# Patient Record
Sex: FEMALE | Race: WHITE | Employment: OTHER | URBAN - METROPOLITAN AREA
[De-identification: names, ages, dates, MRNs, and addresses within clinical notes are randomized per-mention and may not be internally consistent; named-entity substitution may affect disease eponyms.]

---

## 2021-07-10 ENCOUNTER — APPOINTMENT (OUTPATIENT)
Dept: GENERAL RADIOLOGY | Age: 86
DRG: 481 | End: 2021-07-10
Attending: EMERGENCY MEDICINE
Payer: MEDICARE

## 2021-07-10 ENCOUNTER — ANESTHESIA (OUTPATIENT)
Dept: SURGERY | Age: 86
DRG: 481 | End: 2021-07-10
Payer: MEDICARE

## 2021-07-10 ENCOUNTER — APPOINTMENT (OUTPATIENT)
Dept: CT IMAGING | Age: 86
DRG: 481 | End: 2021-07-10
Attending: EMERGENCY MEDICINE
Payer: MEDICARE

## 2021-07-10 ENCOUNTER — ANESTHESIA EVENT (OUTPATIENT)
Dept: SURGERY | Age: 86
DRG: 481 | End: 2021-07-10
Payer: MEDICARE

## 2021-07-10 ENCOUNTER — APPOINTMENT (OUTPATIENT)
Dept: GENERAL RADIOLOGY | Age: 86
DRG: 481 | End: 2021-07-10
Attending: ORTHOPAEDIC SURGERY
Payer: MEDICARE

## 2021-07-10 ENCOUNTER — HOSPITAL ENCOUNTER (INPATIENT)
Age: 86
LOS: 3 days | Discharge: NURSING, REHAB OR LONG-TERM CARE | DRG: 481 | End: 2021-07-13
Attending: EMERGENCY MEDICINE | Admitting: INTERNAL MEDICINE
Payer: MEDICARE

## 2021-07-10 DIAGNOSIS — S72.002D CLOSED FRACTURE OF LEFT HIP WITH ROUTINE HEALING, SUBSEQUENT ENCOUNTER: ICD-10-CM

## 2021-07-10 DIAGNOSIS — I10 HYPERTENSION, UNSPECIFIED TYPE: ICD-10-CM

## 2021-07-10 DIAGNOSIS — S72.002A CLOSED FRACTURE OF LEFT HIP, INITIAL ENCOUNTER (HCC): Primary | ICD-10-CM

## 2021-07-10 PROBLEM — S72.009A HIP FRACTURE (HCC): Status: ACTIVE | Noted: 2021-07-10

## 2021-07-10 LAB
ALBUMIN SERPL-MCNC: 3.2 G/DL (ref 3.5–5)
ALBUMIN/GLOB SERPL: 1.1 {RATIO} (ref 1.1–2.2)
ALP SERPL-CCNC: 77 U/L (ref 45–117)
ALT SERPL-CCNC: 30 U/L (ref 12–78)
ANION GAP SERPL CALC-SCNC: 6 MMOL/L (ref 5–15)
APTT PPP: 22.5 SEC (ref 22.1–31)
AST SERPL-CCNC: 18 U/L (ref 15–37)
BASOPHILS # BLD: 0.1 K/UL (ref 0–0.1)
BASOPHILS NFR BLD: 1 % (ref 0–1)
BILIRUB SERPL-MCNC: 0.7 MG/DL (ref 0.2–1)
BUN SERPL-MCNC: 16 MG/DL (ref 6–20)
BUN/CREAT SERPL: 21 (ref 12–20)
CALCIUM SERPL-MCNC: 8.2 MG/DL (ref 8.5–10.1)
CHLORIDE SERPL-SCNC: 108 MMOL/L (ref 97–108)
CO2 SERPL-SCNC: 28 MMOL/L (ref 21–32)
COMMENT, HOLDF: NORMAL
COVID-19 RAPID TEST, COVR: NOT DETECTED
CREAT SERPL-MCNC: 0.78 MG/DL (ref 0.55–1.02)
DIFFERENTIAL METHOD BLD: ABNORMAL
EOSINOPHIL # BLD: 0.1 K/UL (ref 0–0.4)
EOSINOPHIL NFR BLD: 2 % (ref 0–7)
ERYTHROCYTE [DISTWIDTH] IN BLOOD BY AUTOMATED COUNT: 13.2 % (ref 11.5–14.5)
GLOBULIN SER CALC-MCNC: 3 G/DL (ref 2–4)
GLUCOSE SERPL-MCNC: 135 MG/DL (ref 65–100)
HCT VFR BLD AUTO: 37 % (ref 35–47)
HGB BLD-MCNC: 12.5 G/DL (ref 11.5–16)
IMM GRANULOCYTES # BLD AUTO: 0.1 K/UL (ref 0–0.04)
IMM GRANULOCYTES NFR BLD AUTO: 2 % (ref 0–0.5)
INR PPP: 1 (ref 0.9–1.1)
LYMPHOCYTES # BLD: 1.3 K/UL (ref 0.8–3.5)
LYMPHOCYTES NFR BLD: 17 % (ref 12–49)
MCH RBC QN AUTO: 31.1 PG (ref 26–34)
MCHC RBC AUTO-ENTMCNC: 33.8 G/DL (ref 30–36.5)
MCV RBC AUTO: 92 FL (ref 80–99)
MONOCYTES # BLD: 0.8 K/UL (ref 0–1)
MONOCYTES NFR BLD: 10 % (ref 5–13)
NEUTS SEG # BLD: 5.1 K/UL (ref 1.8–8)
NEUTS SEG NFR BLD: 68 % (ref 32–75)
NRBC # BLD: 0 K/UL (ref 0–0.01)
NRBC BLD-RTO: 0 PER 100 WBC
PLATELET # BLD AUTO: 215 K/UL (ref 150–400)
PMV BLD AUTO: 8.9 FL (ref 8.9–12.9)
POTASSIUM SERPL-SCNC: 3.8 MMOL/L (ref 3.5–5.1)
PROT SERPL-MCNC: 6.2 G/DL (ref 6.4–8.2)
PROTHROMBIN TIME: 10.4 SEC (ref 9–11.1)
RBC # BLD AUTO: 4.02 M/UL (ref 3.8–5.2)
SAMPLES BEING HELD,HOLD: NORMAL
SODIUM SERPL-SCNC: 142 MMOL/L (ref 136–145)
SOURCE, COVRS: NORMAL
THERAPEUTIC RANGE,PTTT: NORMAL SECS (ref 58–77)
WBC # BLD AUTO: 7.4 K/UL (ref 3.6–11)

## 2021-07-10 PROCEDURE — 73552 X-RAY EXAM OF FEMUR 2/>: CPT

## 2021-07-10 PROCEDURE — 77030008684 HC TU ET CUF COVD -B: Performed by: ANESTHESIOLOGY

## 2021-07-10 PROCEDURE — 72125 CT NECK SPINE W/O DYE: CPT

## 2021-07-10 PROCEDURE — 70450 CT HEAD/BRAIN W/O DYE: CPT

## 2021-07-10 PROCEDURE — C1713 ANCHOR/SCREW BN/BN,TIS/BN: HCPCS | Performed by: ORTHOPAEDIC SURGERY

## 2021-07-10 PROCEDURE — 77030026438 HC STYL ET INTUB CARD -A: Performed by: ANESTHESIOLOGY

## 2021-07-10 PROCEDURE — 74011250636 HC RX REV CODE- 250/636: Performed by: EMERGENCY MEDICINE

## 2021-07-10 PROCEDURE — 85025 COMPLETE CBC W/AUTO DIFF WBC: CPT

## 2021-07-10 PROCEDURE — 77030020788: Performed by: ORTHOPAEDIC SURGERY

## 2021-07-10 PROCEDURE — 77030031139 HC SUT VCRL2 J&J -A: Performed by: ORTHOPAEDIC SURGERY

## 2021-07-10 PROCEDURE — 93005 ELECTROCARDIOGRAM TRACING: CPT

## 2021-07-10 PROCEDURE — 96374 THER/PROPH/DIAG INJ IV PUSH: CPT

## 2021-07-10 PROCEDURE — 85730 THROMBOPLASTIN TIME PARTIAL: CPT

## 2021-07-10 PROCEDURE — 80053 COMPREHEN METABOLIC PANEL: CPT

## 2021-07-10 PROCEDURE — 0QS734Z REPOSITION LEFT UPPER FEMUR WITH INTERNAL FIXATION DEVICE, PERCUTANEOUS APPROACH: ICD-10-PCS | Performed by: ORTHOPAEDIC SURGERY

## 2021-07-10 PROCEDURE — 76210000016 HC OR PH I REC 1 TO 1.5 HR: Performed by: ORTHOPAEDIC SURGERY

## 2021-07-10 PROCEDURE — 72170 X-RAY EXAM OF PELVIS: CPT

## 2021-07-10 PROCEDURE — 74011250636 HC RX REV CODE- 250/636: Performed by: ORTHOPAEDIC SURGERY

## 2021-07-10 PROCEDURE — 2709999900 HC NON-CHARGEABLE SUPPLY: Performed by: ORTHOPAEDIC SURGERY

## 2021-07-10 PROCEDURE — 76010000149 HC OR TIME 1 TO 1.5 HR: Performed by: ORTHOPAEDIC SURGERY

## 2021-07-10 PROCEDURE — 74011250636 HC RX REV CODE- 250/636

## 2021-07-10 PROCEDURE — C1769 GUIDE WIRE: HCPCS | Performed by: ORTHOPAEDIC SURGERY

## 2021-07-10 PROCEDURE — 74011250636 HC RX REV CODE- 250/636: Performed by: NURSE ANESTHETIST, CERTIFIED REGISTERED

## 2021-07-10 PROCEDURE — 99285 EMERGENCY DEPT VISIT HI MDM: CPT

## 2021-07-10 PROCEDURE — 77030002933 HC SUT MCRYL J&J -A: Performed by: ORTHOPAEDIC SURGERY

## 2021-07-10 PROCEDURE — 74011250636 HC RX REV CODE- 250/636: Performed by: INTERNAL MEDICINE

## 2021-07-10 PROCEDURE — 85610 PROTHROMBIN TIME: CPT

## 2021-07-10 PROCEDURE — 73502 X-RAY EXAM HIP UNI 2-3 VIEWS: CPT

## 2021-07-10 PROCEDURE — 74011250637 HC RX REV CODE- 250/637: Performed by: ORTHOPAEDIC SURGERY

## 2021-07-10 PROCEDURE — 65270000029 HC RM PRIVATE

## 2021-07-10 PROCEDURE — 87635 SARS-COV-2 COVID-19 AMP PRB: CPT

## 2021-07-10 PROCEDURE — 77030016474 HC BIT DRL QC3 SYNT -C: Performed by: ORTHOPAEDIC SURGERY

## 2021-07-10 PROCEDURE — 71045 X-RAY EXAM CHEST 1 VIEW: CPT

## 2021-07-10 PROCEDURE — 36415 COLL VENOUS BLD VENIPUNCTURE: CPT

## 2021-07-10 PROCEDURE — 74011250636 HC RX REV CODE- 250/636: Performed by: ANESTHESIOLOGY

## 2021-07-10 PROCEDURE — 77030010507 HC ADH SKN DERMBND J&J -B: Performed by: ORTHOPAEDIC SURGERY

## 2021-07-10 PROCEDURE — 76060000033 HC ANESTHESIA 1 TO 1.5 HR: Performed by: ORTHOPAEDIC SURGERY

## 2021-07-10 DEVICE — NAIL IM L235MM DIA10MM 125DEG SHT GRN L PROX FEM TI: Type: IMPLANTABLE DEVICE | Site: FEMUR | Status: FUNCTIONAL

## 2021-07-10 DEVICE — SCREW BNE L110MM DIA10.35MM G TI CANN PERF FOR PROX FEM: Type: IMPLANTABLE DEVICE | Site: FEMUR | Status: FUNCTIONAL

## 2021-07-10 DEVICE — SCREW BNE L40MM DIA5MM TIB LT GRN TI ST CANN LOK FULL THRD: Type: IMPLANTABLE DEVICE | Site: FEMUR | Status: FUNCTIONAL

## 2021-07-10 RX ORDER — SODIUM CHLORIDE 0.9 % (FLUSH) 0.9 %
5-40 SYRINGE (ML) INJECTION AS NEEDED
Status: DISCONTINUED | OUTPATIENT
Start: 2021-07-10 | End: 2021-07-10 | Stop reason: HOSPADM

## 2021-07-10 RX ORDER — FENTANYL CITRATE 50 UG/ML
INJECTION, SOLUTION INTRAMUSCULAR; INTRAVENOUS AS NEEDED
Status: DISCONTINUED | OUTPATIENT
Start: 2021-07-10 | End: 2021-07-10 | Stop reason: HOSPADM

## 2021-07-10 RX ORDER — SODIUM CHLORIDE 0.9 % (FLUSH) 0.9 %
5-40 SYRINGE (ML) INJECTION AS NEEDED
Status: DISCONTINUED | OUTPATIENT
Start: 2021-07-10 | End: 2021-07-13 | Stop reason: HOSPADM

## 2021-07-10 RX ORDER — ENOXAPARIN SODIUM 100 MG/ML
40 INJECTION SUBCUTANEOUS DAILY
Status: DISCONTINUED | OUTPATIENT
Start: 2021-07-11 | End: 2021-07-10 | Stop reason: SDUPTHER

## 2021-07-10 RX ORDER — ENOXAPARIN SODIUM 100 MG/ML
30 INJECTION SUBCUTANEOUS EVERY 12 HOURS
Status: DISCONTINUED | OUTPATIENT
Start: 2021-07-10 | End: 2021-07-13 | Stop reason: HOSPADM

## 2021-07-10 RX ORDER — ACETAMINOPHEN 325 MG/1
650 TABLET ORAL EVERY 6 HOURS
Status: DISCONTINUED | OUTPATIENT
Start: 2021-07-11 | End: 2021-07-13 | Stop reason: HOSPADM

## 2021-07-10 RX ORDER — ESCITALOPRAM OXALATE 10 MG/1
10 TABLET ORAL DAILY
Status: DISCONTINUED | OUTPATIENT
Start: 2021-07-11 | End: 2021-07-13 | Stop reason: HOSPADM

## 2021-07-10 RX ORDER — PREDNISONE 5 MG/1
10 TABLET ORAL
Status: DISCONTINUED | OUTPATIENT
Start: 2021-07-11 | End: 2021-07-11

## 2021-07-10 RX ORDER — FLUMAZENIL 0.1 MG/ML
0.2 INJECTION INTRAVENOUS
Status: DISCONTINUED | OUTPATIENT
Start: 2021-07-10 | End: 2021-07-10 | Stop reason: HOSPADM

## 2021-07-10 RX ORDER — AMOXICILLIN 250 MG
1 CAPSULE ORAL 2 TIMES DAILY
Status: DISCONTINUED | OUTPATIENT
Start: 2021-07-10 | End: 2021-07-13 | Stop reason: HOSPADM

## 2021-07-10 RX ORDER — ACETAMINOPHEN 325 MG/1
650 TABLET ORAL
Status: DISCONTINUED | OUTPATIENT
Start: 2021-07-10 | End: 2021-07-13 | Stop reason: HOSPADM

## 2021-07-10 RX ORDER — SUCCINYLCHOLINE CHLORIDE 20 MG/ML
INJECTION INTRAMUSCULAR; INTRAVENOUS AS NEEDED
Status: DISCONTINUED | OUTPATIENT
Start: 2021-07-10 | End: 2021-07-10 | Stop reason: HOSPADM

## 2021-07-10 RX ORDER — ONDANSETRON 2 MG/ML
4 INJECTION INTRAMUSCULAR; INTRAVENOUS AS NEEDED
Status: DISCONTINUED | OUTPATIENT
Start: 2021-07-10 | End: 2021-07-10 | Stop reason: HOSPADM

## 2021-07-10 RX ORDER — ONDANSETRON 2 MG/ML
4 INJECTION INTRAMUSCULAR; INTRAVENOUS
Status: DISCONTINUED | OUTPATIENT
Start: 2021-07-10 | End: 2021-07-13 | Stop reason: HOSPADM

## 2021-07-10 RX ORDER — ALBUTEROL SULFATE 0.83 MG/ML
2.5 SOLUTION RESPIRATORY (INHALATION) AS NEEDED
Status: DISCONTINUED | OUTPATIENT
Start: 2021-07-10 | End: 2021-07-10 | Stop reason: HOSPADM

## 2021-07-10 RX ORDER — SODIUM CHLORIDE, SODIUM LACTATE, POTASSIUM CHLORIDE, CALCIUM CHLORIDE 600; 310; 30; 20 MG/100ML; MG/100ML; MG/100ML; MG/100ML
INJECTION, SOLUTION INTRAVENOUS
Status: DISCONTINUED | OUTPATIENT
Start: 2021-07-10 | End: 2021-07-10 | Stop reason: HOSPADM

## 2021-07-10 RX ORDER — CEFAZOLIN SODIUM 1 G/3ML
INJECTION, POWDER, FOR SOLUTION INTRAMUSCULAR; INTRAVENOUS AS NEEDED
Status: DISCONTINUED | OUTPATIENT
Start: 2021-07-10 | End: 2021-07-10 | Stop reason: HOSPADM

## 2021-07-10 RX ORDER — ESCITALOPRAM OXALATE 20 MG/1
20 TABLET ORAL DAILY
COMMUNITY

## 2021-07-10 RX ORDER — FENTANYL CITRATE 50 UG/ML
25 INJECTION, SOLUTION INTRAMUSCULAR; INTRAVENOUS
Status: DISCONTINUED | OUTPATIENT
Start: 2021-07-10 | End: 2021-07-10 | Stop reason: HOSPADM

## 2021-07-10 RX ORDER — HYDROMORPHONE HYDROCHLORIDE 1 MG/ML
0.2 INJECTION, SOLUTION INTRAMUSCULAR; INTRAVENOUS; SUBCUTANEOUS
Status: DISCONTINUED | OUTPATIENT
Start: 2021-07-10 | End: 2021-07-13 | Stop reason: HOSPADM

## 2021-07-10 RX ORDER — SODIUM CHLORIDE, SODIUM LACTATE, POTASSIUM CHLORIDE, CALCIUM CHLORIDE 600; 310; 30; 20 MG/100ML; MG/100ML; MG/100ML; MG/100ML
125 INJECTION, SOLUTION INTRAVENOUS CONTINUOUS
Status: DISPENSED | OUTPATIENT
Start: 2021-07-10 | End: 2021-07-11

## 2021-07-10 RX ORDER — DIPHENHYDRAMINE HYDROCHLORIDE 50 MG/ML
12.5 INJECTION, SOLUTION INTRAMUSCULAR; INTRAVENOUS AS NEEDED
Status: DISCONTINUED | OUTPATIENT
Start: 2021-07-10 | End: 2021-07-10 | Stop reason: HOSPADM

## 2021-07-10 RX ORDER — FENTANYL CITRATE 50 UG/ML
25 INJECTION, SOLUTION INTRAMUSCULAR; INTRAVENOUS
Status: COMPLETED | OUTPATIENT
Start: 2021-07-10 | End: 2021-07-10

## 2021-07-10 RX ORDER — NALOXONE HYDROCHLORIDE 0.4 MG/ML
0.04 INJECTION, SOLUTION INTRAMUSCULAR; INTRAVENOUS; SUBCUTANEOUS
Status: DISCONTINUED | OUTPATIENT
Start: 2021-07-10 | End: 2021-07-10 | Stop reason: HOSPADM

## 2021-07-10 RX ORDER — FERROUS SULFATE, DRIED 160(50) MG
1 TABLET, EXTENDED RELEASE ORAL
Status: DISCONTINUED | OUTPATIENT
Start: 2021-07-11 | End: 2021-07-13 | Stop reason: HOSPADM

## 2021-07-10 RX ORDER — PREDNISONE 10 MG/1
10 TABLET ORAL DAILY
COMMUNITY
End: 2021-07-13

## 2021-07-10 RX ORDER — SODIUM CHLORIDE 0.9 % (FLUSH) 0.9 %
5-40 SYRINGE (ML) INJECTION EVERY 8 HOURS
Status: DISCONTINUED | OUTPATIENT
Start: 2021-07-10 | End: 2021-07-13 | Stop reason: HOSPADM

## 2021-07-10 RX ORDER — HYDROMORPHONE HYDROCHLORIDE 1 MG/ML
.25-1 INJECTION, SOLUTION INTRAMUSCULAR; INTRAVENOUS; SUBCUTANEOUS
Status: DISCONTINUED | OUTPATIENT
Start: 2021-07-10 | End: 2021-07-10 | Stop reason: HOSPADM

## 2021-07-10 RX ORDER — ACETAMINOPHEN 650 MG/1
650 SUPPOSITORY RECTAL
Status: DISCONTINUED | OUTPATIENT
Start: 2021-07-10 | End: 2021-07-13 | Stop reason: HOSPADM

## 2021-07-10 RX ORDER — SODIUM CHLORIDE 0.9 % (FLUSH) 0.9 %
5-40 SYRINGE (ML) INJECTION EVERY 8 HOURS
Status: DISCONTINUED | OUTPATIENT
Start: 2021-07-10 | End: 2021-07-10 | Stop reason: HOSPADM

## 2021-07-10 RX ORDER — PROPOFOL 10 MG/ML
INJECTION, EMULSION INTRAVENOUS AS NEEDED
Status: DISCONTINUED | OUTPATIENT
Start: 2021-07-10 | End: 2021-07-10 | Stop reason: HOSPADM

## 2021-07-10 RX ORDER — DEXAMETHASONE SODIUM PHOSPHATE 4 MG/ML
INJECTION, SOLUTION INTRA-ARTICULAR; INTRALESIONAL; INTRAMUSCULAR; INTRAVENOUS; SOFT TISSUE AS NEEDED
Status: DISCONTINUED | OUTPATIENT
Start: 2021-07-10 | End: 2021-07-10 | Stop reason: HOSPADM

## 2021-07-10 RX ORDER — FACIAL-BODY WIPES
10 EACH TOPICAL DAILY PRN
Status: DISCONTINUED | OUTPATIENT
Start: 2021-07-12 | End: 2021-07-13 | Stop reason: HOSPADM

## 2021-07-10 RX ORDER — FENTANYL CITRATE 50 UG/ML
INJECTION, SOLUTION INTRAMUSCULAR; INTRAVENOUS
Status: COMPLETED
Start: 2021-07-10 | End: 2021-07-10

## 2021-07-10 RX ORDER — POLYETHYLENE GLYCOL 3350 17 G/17G
17 POWDER, FOR SOLUTION ORAL DAILY PRN
Status: DISCONTINUED | OUTPATIENT
Start: 2021-07-10 | End: 2021-07-13 | Stop reason: HOSPADM

## 2021-07-10 RX ORDER — LIDOCAINE HYDROCHLORIDE 10 MG/ML
0.1 INJECTION, SOLUTION EPIDURAL; INFILTRATION; INTRACAUDAL; PERINEURAL AS NEEDED
Status: DISCONTINUED | OUTPATIENT
Start: 2021-07-10 | End: 2021-07-10 | Stop reason: HOSPADM

## 2021-07-10 RX ORDER — VERAPAMIL HYDROCHLORIDE 40 MG/1
120 TABLET ORAL
COMMUNITY

## 2021-07-10 RX ORDER — SODIUM CHLORIDE, SODIUM LACTATE, POTASSIUM CHLORIDE, CALCIUM CHLORIDE 600; 310; 30; 20 MG/100ML; MG/100ML; MG/100ML; MG/100ML
125 INJECTION, SOLUTION INTRAVENOUS CONTINUOUS
Status: DISCONTINUED | OUTPATIENT
Start: 2021-07-10 | End: 2021-07-10 | Stop reason: HOSPADM

## 2021-07-10 RX ORDER — SODIUM CHLORIDE 9 MG/ML
125 INJECTION, SOLUTION INTRAVENOUS CONTINUOUS
Status: DISPENSED | OUTPATIENT
Start: 2021-07-10 | End: 2021-07-11

## 2021-07-10 RX ORDER — ONDANSETRON 4 MG/1
4 TABLET, ORALLY DISINTEGRATING ORAL
Status: DISCONTINUED | OUTPATIENT
Start: 2021-07-10 | End: 2021-07-13 | Stop reason: HOSPADM

## 2021-07-10 RX ORDER — NALOXONE HYDROCHLORIDE 0.4 MG/ML
0.4 INJECTION, SOLUTION INTRAMUSCULAR; INTRAVENOUS; SUBCUTANEOUS AS NEEDED
Status: DISCONTINUED | OUTPATIENT
Start: 2021-07-10 | End: 2021-07-13 | Stop reason: HOSPADM

## 2021-07-10 RX ORDER — VERAPAMIL HYDROCHLORIDE 120 MG/1
120 TABLET, FILM COATED, EXTENDED RELEASE ORAL
Status: DISCONTINUED | OUTPATIENT
Start: 2021-07-11 | End: 2021-07-13 | Stop reason: HOSPADM

## 2021-07-10 RX ORDER — POLYETHYLENE GLYCOL 3350 17 G/17G
17 POWDER, FOR SOLUTION ORAL DAILY
Status: DISCONTINUED | OUTPATIENT
Start: 2021-07-11 | End: 2021-07-13 | Stop reason: HOSPADM

## 2021-07-10 RX ADMIN — CEFAZOLIN SODIUM 2 G: 1 POWDER, FOR SOLUTION INTRAMUSCULAR; INTRAVENOUS at 18:53

## 2021-07-10 RX ADMIN — Medication 10 ML: at 22:34

## 2021-07-10 RX ADMIN — FENTANYL CITRATE 25 MCG: 50 INJECTION, SOLUTION INTRAMUSCULAR; INTRAVENOUS at 15:25

## 2021-07-10 RX ADMIN — HYDROMORPHONE HYDROCHLORIDE 0.2 MG: 1 INJECTION, SOLUTION INTRAMUSCULAR; INTRAVENOUS; SUBCUTANEOUS at 16:18

## 2021-07-10 RX ADMIN — HYDROMORPHONE HYDROCHLORIDE 0.5 MG: 1 INJECTION, SOLUTION INTRAMUSCULAR; INTRAVENOUS; SUBCUTANEOUS at 20:02

## 2021-07-10 RX ADMIN — SODIUM CHLORIDE, POTASSIUM CHLORIDE, SODIUM LACTATE AND CALCIUM CHLORIDE: 600; 310; 30; 20 INJECTION, SOLUTION INTRAVENOUS at 18:30

## 2021-07-10 RX ADMIN — HYDROMORPHONE HYDROCHLORIDE 0.5 MG: 1 INJECTION, SOLUTION INTRAMUSCULAR; INTRAVENOUS; SUBCUTANEOUS at 20:14

## 2021-07-10 RX ADMIN — SODIUM CHLORIDE 125 ML/HR: 9 INJECTION, SOLUTION INTRAVENOUS at 20:11

## 2021-07-10 RX ADMIN — DEXAMETHASONE SODIUM PHOSPHATE 8 MG: 4 INJECTION, SOLUTION INTRAMUSCULAR; INTRAVENOUS at 19:00

## 2021-07-10 RX ADMIN — FENTANYL CITRATE 50 MCG: 50 INJECTION, SOLUTION INTRAMUSCULAR; INTRAVENOUS at 18:53

## 2021-07-10 RX ADMIN — PROPOFOL 100 MG: 10 INJECTION, EMULSION INTRAVENOUS at 18:41

## 2021-07-10 RX ADMIN — HYDROMORPHONE HYDROCHLORIDE 0.5 MG: 1 INJECTION, SOLUTION INTRAMUSCULAR; INTRAVENOUS; SUBCUTANEOUS at 20:30

## 2021-07-10 RX ADMIN — FENTANYL CITRATE 25 MCG: 50 INJECTION, SOLUTION INTRAMUSCULAR; INTRAVENOUS at 18:20

## 2021-07-10 RX ADMIN — ONDANSETRON 4 MG: 2 INJECTION INTRAMUSCULAR; INTRAVENOUS at 16:17

## 2021-07-10 RX ADMIN — FENTANYL CITRATE 25 MCG: 50 INJECTION, SOLUTION INTRAMUSCULAR; INTRAVENOUS at 18:30

## 2021-07-10 RX ADMIN — Medication 5 ML: at 16:20

## 2021-07-10 RX ADMIN — FENTANYL CITRATE 50 MCG: 50 INJECTION, SOLUTION INTRAMUSCULAR; INTRAVENOUS at 18:41

## 2021-07-10 RX ADMIN — SUCCINYLCHOLINE CHLORIDE 100 MG: 20 INJECTION, SOLUTION INTRAMUSCULAR; INTRAVENOUS at 18:41

## 2021-07-10 RX ADMIN — DOCUSATE SODIUM 50MG AND SENNOSIDES 8.6MG 1 TABLET: 8.6; 5 TABLET, FILM COATED ORAL at 22:33

## 2021-07-10 NOTE — ED TRIAGE NOTES
Patient arrives via EMS from daughter's house following GLF and left hip pain. Patient presents with external rotation of left leg, with shortening. Per EMS, patient uses walker. States patient went to get clothing, turning away from walker when she fell. Denies hitting head, no LOC, no use of blood thinning medications. Patient is A&O x 4. In route patient received:   4 mg of zofran  100 mcg of fentanyl. B  BP: 146/62  HR: 62  RR: 16  SpO2: 97% RA. Hx of vertigo, breast cancer, left mastectomy.

## 2021-07-10 NOTE — INTERVAL H&P NOTE
Update History & Physical    The Patient's History and Physical of December 10,   H&P was reviewed with the patient and I examined the patient. There was no change. The surgical site was confirmed by the patient and me. Plan:  The risk, benefits, expected outcome, and alternative to the recommended procedure have been discussed with the patient. Patient understands and wants to proceed with the procedure.     Electronically signed by Florencio Hernandez MD on 7/10/2021 at 6:33 PM

## 2021-07-10 NOTE — H&P
Hospitalist Admission Note    NAME: Naila Mcdonnell   :  1929   MRN:  [id number]     Date/Time:  7/10/2021 4:09 PM    Patient PCP: Leonid Hatch, Not On File, [place]  ________________________________________________________________________    Given the patient's current clinical presentation, I have a high level of concern for decompensation if discharged from the emergency department. Complex decision making was performed, which includes reviewing the patient's available past medical records, laboratory results, and x-ray films. My assessment of this patient's clinical condition and my plan of care is as follows. Assessment / Plan:    #L hip fx: slightly rotated, superiorly displaced, intertrochanteric fracture of the proximal left femur sustained after mechanical fall. She has essentially no cardiac risk factors. Discussed that greatest risks for her are due to chronic steroid use as discussed below. From a medical standpoint she does not require further cardiopulm eval prior to surgery. - To OR this evening   - [place], moving to [place] soon but will likely need SNF prior to that     #Chronic Steroid Use: Presently on 10mg, so at risk for HPA suppression. In the short term will need to monitor for adrenal insufficiency. In the long term, she is at higher risk of orthopedic infx due to steroid use, so would recommend tapering off. - For now, cont pred 10mg and monitor for stress dosing need   - Post-operatively, would taper off over 2 weeks    #HTN: On verapamil as outpt. Higher here in s/o pain   - Cont verap      I have personally reviewed the radiographs, laboratory data in Epic and decisions and statements above are based partially on this personal interpretation. Code Status: Full Code  DVT Prophylaxis: Lovenox  GI Prophylaxis: not indicated       Subjective:   CHIEF COMPLAINT: \"fall\"    HISTORY OF PRESENT ILLNESS:     Glenn Underwood is a 80 y.o.  F with PMHx HTN and arthritis presents after mechanical fall resulting in L intertrochanteric fracture with displacement. She lives in [place] and was visiting family (will be moving in). She was in the closet, turned, and went down. She has had some balance issues recently. She did not her her head or lose consciousness. She did not have cp, sob before this.      PMHx:  HTN  Depression  Arthritis  [facility] [place]    Surg Hx:   Appy  Mastectomy  Choly    Social History     Tobacco Use    Smoking status: Not on file   Substance Use Topics    Alcohol use: Not on file     Fhx:   No hx CAD, issues with anesthesia    Allergies   Allergen Reactions    Latex Hives    Penicillins Unknown (comments)        Prior to Admission medications    Not on File     REVIEW OF SYSTEMS:  See HPI for details  General: negative for fever, chills, sweats, weakness, weight loss  Eyes: negative for blurred vision, eye pain, loss of vision, diplopia  Ear Nose and Throat: negative for rhinorrhea, pharyngitis, otalgia, tinnitus, speech or swallowing difficulties  Respiratory:  negative for pleuritic pain, cough, sputum production, wheezing, SOB, FONTAINE  Cardiology:  negative for chest pain, palpitations, orthopnea, PND, edema, syncope   Gastrointestinal: negative for abdominal pain, N/V, dysphagia, change in bowel habits, bleeding  Genitourinary: negative for frequency, urgency, dysuria, hematuria, incontinence  Muskuloskeletal : negative for arthralgia, myalgia  Hematology: negative for easy bruising, bleeding, lymphadenopathy  Dermatological: negative for rash, ulceration, mole change, new lesion  Endocrine: negative for hot flashes or polydipsia  Neurological: negative for headache, dizziness, confusion, focal weakness, paresthesia, memory loss, gait disturbance  Psychological: negative for anxiety, depression, agitation    Objective:   VITALS:    Visit Vitals  BP (!) 179/58 (BP 1 Location: Right upper arm, BP Patient Position: At rest)   Pulse 70   Temp 98.2 °F (36.8 °C)   Resp 18   Ht 5' 2\" (1.575 m)   Wt 67.1 kg (148 lb)   SpO2 94%   BMI 27.07 kg/m²     PHYSICAL EXAM:    Physical Exam:    Gen: Well-developed, well-nourished, in no acute distress  HEENT:  Pink conjunctivae, PERRL, hearing intact to voice, moist mucous membranes  Neck: Supple, without masses, thyroid non-tender  Resp: No accessory muscle use, clear breath sounds without wheezes rales or rhonchi  Card: No murmurs, normal S1, S2 without thrills, bruits or peripheral edema  Abd:  Soft, non-tender, non-distended, normoactive bowel sounds are present, no palpable organomegaly and no detectable hernias  Lymph:  No cervical or inguinal adenopathy  Musc: No cyanosis or clubbing  Skin: No rashes or ulcers, skin turgor is good  Neuro:  Cranial nerves are grossly intact, no focal motor weakness, follows commands appropriately  Psych:  Good insight, oriented to person, place and time, alert          _______________________________________________________________________  Care Plan discussed with:    Comments   Patient x Discussed with patient in room. POC outlined and Questions answered    Family  x    RN x    Care Manager x                   Consultant:  raleigh JAY MD   _______________________________________________________________________  Recommended Disposition:   Home with Family x   HH/PT/OT/RN    SNF/LTC    [facility]    ________________________________________________________________________  TOTAL TIME:  60 Minutes        Comments   >50% of visit spent in counseling and coordination of care  Chart reviewed  Discussion with patient and/or family and questions answered     ________________________________________________________________________  Signed: Radha Brannon MD    This note will not be viewable in [address] Procedures: see electronic medical records for all procedures/Xrays and details which were not copied into this note but were reviewed prior to creation of Plan.     LAB DATA REVIEWED:    Recent Results (from the past 24 hour(s)) CBC WITH AUTOMATED DIFF    Collection Time: 07/10/21  2:07 PM   Result Value Ref Range    WBC 7.4 3.6 - 11.0 K/uL    RBC 4.02 3.80 - 5.20 M/uL    HGB 12.5 11.5 - 16.0 g/dL    HCT 37.0 35.0 - 47.0 %    MCV 92.0 80.0 - 99.0 FL    MCH 31.1 26.0 - 34.0 PG    MCHC 33.8 30.0 - 36.5 g/dL    RDW 13.2 11.5 - 14.5 %    PLATELET 810 452 - 781 K/uL    MPV 8.9 8.9 - 12.9 FL    NRBC 0.0 0  WBC    ABSOLUTE NRBC 0.00 0.00 - 0.01 K/uL    NEUTROPHILS 68 32 - 75 %    LYMPHOCYTES 17 12 - 49 %    MONOCYTES 10 5 - 13 %    EOSINOPHILS 2 0 - 7 %    BASOPHILS 1 0 - 1 %    IMMATURE GRANULOCYTES 2 (H) 0.0 - 0.5 %    ABS. NEUTROPHILS 5.1 1.8 - 8.0 K/UL    ABS. LYMPHOCYTES 1.3 0.8 - 3.5 K/UL    ABS. MONOCYTES 0.8 0.0 - 1.0 K/UL    ABS. EOSINOPHILS 0.1 0.0 - 0.4 K/UL    ABS. BASOPHILS 0.1 0.0 - 0.1 K/UL    ABS. IMM. GRANS. 0.1 (H) 0.00 - 0.04 K/UL    DF AUTOMATED     METABOLIC PANEL, COMPREHENSIVE    Collection Time: 07/10/21  2:07 PM   Result Value Ref Range    Sodium 142 136 - 145 mmol/L    Potassium 3.8 3.5 - 5.1 mmol/L    Chloride 108 97 - 108 mmol/L    CO2 28 21 - 32 mmol/L    Anion gap 6 5 - 15 mmol/L    Glucose 135 (H) 65 - 100 mg/dL    BUN 16 6 - 20 MG/DL    Creatinine 0.78 0.55 - 1.02 MG/DL    BUN/Creatinine ratio 21 (H) 12 - 20      GFR est AA >60 >60 ml/min/1.73m2    GFR est non-AA >60 >60 ml/min/1.73m2    Calcium 8.2 (L) 8.5 - 10.1 MG/DL    Bilirubin, total 0.7 0.2 - 1.0 MG/DL    ALT (SGPT) 30 12 - 78 U/L    AST (SGOT) 18 15 - 37 U/L    Alk.  phosphatase 77 45 - 117 U/L    Protein, total 6.2 (L) 6.4 - 8.2 g/dL    Albumin 3.2 (L) 3.5 - 5.0 g/dL    Globulin 3.0 2.0 - 4.0 g/dL    A-G Ratio 1.1 1.1 - 2.2     PROTHROMBIN TIME + INR    Collection Time: 07/10/21  2:07 PM   Result Value Ref Range    INR 1.0 0.9 - 1.1      Prothrombin time 10.4 9.0 - 11.1 sec   PTT    Collection Time: 07/10/21  2:07 PM   Result Value Ref Range    aPTT 22.5 22.1 - 31.0 sec    aPTT, therapeutic range     58.0 - 77.0 SECS   SAMPLES BEING HELD Collection Time: 07/10/21  2:07 PM   Result Value Ref Range    SAMPLES BEING HELD 1 SST, 1 RED     COMMENT        Add-on orders for these samples will be processed based on acceptable specimen integrity and analyte stability, which may vary by analyte.

## 2021-07-10 NOTE — OP NOTES
OPERATIVE REPORT   Admit Date: 7/10/2021  Admit Diagnosis: Hip fracture ([address] [S72.009A]    Date of Procedure: 7/10/2021   Preoperative Diagnosis: intertrochanteric fracture left hip  Postoperative Diagnosis: * No post-op diagnosis entered *    Procedure: Procedure(s): FEMORAL INTERTROCHANTERIC NAIL INSERTION LEFT  Surgeon: Lizette Peters MD  Assistant(s): None  Anesthesia: General   Estimated Blood Loss: 20cc  Specimens: * No specimens in log *   Complications: None        INDICATIONS:     The patient is a 80 y.o.,  with an acute fall and was found to have an intertrochanteric hip fracture. The patient was evaluated by the hospitalist and cleared for surgery. Informed consent obtained including a discussion of the risks and benefits, which include, but are not limited to, bleeding, infection, neurovascular damage, wound complications, pain and stiffness in the knee, periprosthetic loosening, fracture dislocation and DVT, the patient consented for the procedure. DESCRIPTION OF PROCEDURE:             The patient underwent the appropriate anesthesia and was taken to the operating room. She was positioned on the fracture table and both extremities were well padded with a well padded post for traction. Under C-arm guidance the fracture was reduced and verified in both AP and lateral planes. The hip was then prepped and drapped in a sterile fashion. Prior to the incision the appropriate time-out was performed. Utilizing fluoroscopic guidance the start point was established on AP and lateral views with the fracture reduced. After the entry was created a bulb tipped guide-wire was inserted and placement verified on AP and lateral at the hip and knee. The nail length was measured off the guide-wire and one-step proximal and distal reaming was performed. The nail was the inserted to the appropriate depth with fluoroscopy guidance.  The position was verified on AP and lateral views and then a separate incision was made laterally for lag screw insertion. The cannula inserted and the guide-wire was placed under fluoro to the sub-chondral bone of the femoral head. This was measured and then the appropriate depth drill was used to create an entry hole. The lag screw was inserted and the fracture compressed as needed. Final films were obtained of the hip and knee. The wounds were copiously irrigated. Closure was performed in layer with 2-Vicryl for the subcutaneous tissue, 2-0 Vicryl for the skin and stapples. A sterile dressing was then applied. The patient was taken to recovery in a stable condition.      IMPLANTS :   Sythes TFN Nail, intermediate

## 2021-07-10 NOTE — CONSULTS
ORTHO CONSULT NOTE    Date of Consultation:  July 10, 2021  Referring Physician:  Naila Smith  CC: left hip pain    HPI:  Corey Smith is a 80 y.o. female PMH lumbar spine surgery and breast ca who c/o left hip pain s/p GLF at daughter's home. She states she simply lost her balance and fell onto left side. She denies CP, lightheaded, or dizziness prior to her fall. She denies open hip wound and foot numbness. She denies blood thinners at home. Last PO intake 10:00 breakfast.    At baseline, she ambulates with a walker and resides in [address] [place] [address]. She already planned to move in with her daughter in [facility] later this year. Social History     Tobacco Use    Smoking status: Not on file   Substance Use Topics    Alcohol use: Not on file     Allergies   Allergen Reactions    Latex Hives    Penicillins Unknown (comments)        Review of Systems:  Per HPI. Objective:     Patient Vitals for the past 8 hrs:   BP Temp Pulse Resp SpO2 Height Weight   07/10/21 1352 (!) 179/58 98.2 °F (36.8 °C) 70 18 94 % 5' 2\" (1.575 m) 67.1 kg (148 lb)     Temp (24hrs), Av.2 °F (36.8 °C), Min:98.2 °F (36.8 °C), Max:98.2 °F (36.8 °C)      EXAM:   NAD. Answers questions appropriately. Oriented. Daughter present bedside. Moves BUE spontaneously with NTTP long bones and joints. RLE no pain PROM, NTTP long bones and joints. Moves foot OK with SILT and CR toes < 2 secs. LLE shortened. Hip skin intact and soft compartments. Knee, ankle and foot NTTP. Moves foot OK with SILT and CR toes < 2 secs. Bilat calf soft and NTTP. Imaging Review:   Left femur xray 7/10:  Intertrochanteric fracture.     Labs:   Recent Results (from the past 24 hour(s))   CBC WITH AUTOMATED DIFF    Collection Time: 07/10/21  2:07 PM   Result Value Ref Range    WBC 7.4 3.6 - 11.0 K/uL    RBC 4.02 3.80 - 5.20 M/uL    HGB 12.5 11.5 - 16.0 g/dL    HCT 37.0 35.0 - 47.0 %    MCV 92.0 80.0 - 99.0 FL    MCH 31.1 26.0 - 34.0 PG    MCHC 33.8 30.0 - 36.5 g/dL    RDW 13.2 11.5 - 14.5 %    PLATELET 298 224 - 323 K/uL    MPV 8.9 8.9 - 12.9 FL    NRBC 0.0 0  WBC    ABSOLUTE NRBC 0.00 0.00 - 0.01 K/uL    NEUTROPHILS 68 32 - 75 %    LYMPHOCYTES 17 12 - 49 %    MONOCYTES 10 5 - 13 %    EOSINOPHILS 2 0 - 7 %    BASOPHILS 1 0 - 1 %    IMMATURE GRANULOCYTES 2 (H) 0.0 - 0.5 %    ABS. NEUTROPHILS 5.1 1.8 - 8.0 K/UL    ABS. LYMPHOCYTES 1.3 0.8 - 3.5 K/UL    ABS. MONOCYTES 0.8 0.0 - 1.0 K/UL    ABS. EOSINOPHILS 0.1 0.0 - 0.4 K/UL    ABS. BASOPHILS 0.1 0.0 - 0.1 K/UL    ABS. IMM. GRANS. 0.1 (H) 0.00 - 0.04 K/UL    DF AUTOMATED     METABOLIC PANEL, COMPREHENSIVE    Collection Time: 07/10/21  2:07 PM   Result Value Ref Range    Sodium 142 136 - 145 mmol/L    Potassium 3.8 3.5 - 5.1 mmol/L    Chloride 108 97 - 108 mmol/L    CO2 28 21 - 32 mmol/L    Anion gap 6 5 - 15 mmol/L    Glucose 135 (H) 65 - 100 mg/dL    BUN 16 6 - 20 MG/DL    Creatinine 0.78 0.55 - 1.02 MG/DL    BUN/Creatinine ratio 21 (H) 12 - 20      GFR est AA >60 >60 ml/min/1.73m2    GFR est non-AA >60 >60 ml/min/1.73m2    Calcium 8.2 (L) 8.5 - 10.1 MG/DL    Bilirubin, total 0.7 0.2 - 1.0 MG/DL    ALT (SGPT) 30 12 - 78 U/L    AST (SGOT) 18 15 - 37 U/L    Alk. phosphatase 77 45 - 117 U/L    Protein, total 6.2 (L) 6.4 - 8.2 g/dL    Albumin 3.2 (L) 3.5 - 5.0 g/dL    Globulin 3.0 2.0 - 4.0 g/dL    A-G Ratio 1.1 1.1 - 2.2     PROTHROMBIN TIME + INR    Collection Time: 07/10/21  2:07 PM   Result Value Ref Range    INR 1.0 0.9 - 1.1      Prothrombin time 10.4 9.0 - 11.1 sec   PTT    Collection Time: 07/10/21  2:07 PM   Result Value Ref Range    aPTT 22.5 22.1 - 31.0 sec    aPTT, therapeutic range     58.0 - 77.0 SECS   SAMPLES BEING HELD    Collection Time: 07/10/21  2:07 PM   Result Value Ref Range    SAMPLES BEING HELD 1 SST, 1 RED     COMMENT        Add-on orders for these samples will be processed based on acceptable specimen integrity and analyte stability, which may vary by analyte. Impression:     Patient Active Problem List    Diagnosis Date Noted    Hip fracture ([facility] [address] 07/10/2021     Active Problems:    Hip fracture ([facility] [address] (7/10/2021)        Plan:   I explained the nature of the injury and discussed the recommended surgery. I discussed potential risks/benefits/alternatives of surgery and patient consents. Daughter present for discussion and also in agreement. Plan for IM nail left hip fracture. Medical evaluation completed and no further work-up needed per Dr. Effie Lesches. Bedrest.  NPO. Ice. SCDs OK. Hold pre-op anticoagulants. I have discussed with the patient the rationale for potential blood component transfusion; its benefits in treating or preventing fatigue, organ damage, or death; and its risk which includes mild transfusion reactions, rare risk of blood borne infection, or more serious but rare reactions. I have discussed the alternatives to transfusion, including the risk and consequences of not receiving transfusion. The patient had an opportunity to ask questions and had agreed to proceed with transfusion of blood components if deemed appropriate in shawn-operative period. Dr. Soni Nino is aware and agrees with above plan.       SOLITARIO Brian  Orthopedic Trauma Service  [facility]

## 2021-07-10 NOTE — ANESTHESIA POSTPROCEDURE EVALUATION
Procedure(s): FEMORAL INTERTROCHANTERIC NAIL INSERTION LEFT. general    Anesthesia Post Evaluation      Multimodal analgesia: multimodal analgesia not used between 6 hours prior to anesthesia start to PACU discharge  Patient location during evaluation: PACU  Patient participation: complete - patient participated  Level of consciousness: awake  Pain management: adequate  Airway patency: patent  Anesthetic complications: no  Cardiovascular status: acceptable, blood pressure returned to baseline and hemodynamically stable  Respiratory status: acceptable  Hydration status: acceptable  Post anesthesia nausea and vomiting:  controlled  Final Post Anesthesia Temperature Assessment:  Normothermia (36.0-37.5 degrees C)      INITIAL Post-op Vital signs:   Vitals Value Taken Time   /64 07/10/21 1945   Temp 37.2 °C (99 °F) 07/10/21 1945   Pulse 77 07/10/21 1950   Resp 18 07/10/21 1950   SpO2 100 % 07/10/21 1950   Vitals shown include unvalidated device data.

## 2021-07-10 NOTE — CONSULTS
Orthopaedic PRE-OP Admission History and Physical        Subjective:   Patient is a 80 y.o.  female who presented for  Left prox femur fracture. The patient was evaluated and determined the most appropriate plan of care is to proceed with surgical intervention. Conservative measures were not indicated or successful. Tamiko Bryan is a 80 y.o. female PMH lumbar spine surgery and breast ca who c/o left hip pain s/p GLF at daughter's home. She states she simply lost her balance and fell onto left side. She denies CP, lightheaded, or dizziness prior to her fall. She denies open hip wound and foot numbness. She denies blood thinners at home. Last PO intake 10:00 breakfast.     At baseline, she ambulates with a walker and resides in [address] [place] [address] in [place]. She already planned to move in with her daughter in [facility] later this year. Patient Active Problem List    Diagnosis Date Noted    Hip fracture ([facility] [address] 07/10/2021     Past Medical History:   Diagnosis Date    Arthritis     Cancer ([facility] [address]     Chronic pain     Hypertension     Ill-defined condition     Macular degeneration    Ill-defined condition     Vertigo      Past Surgical History:   Procedure Laterality Date    HX ORTHOPAEDIC Right     achilles tendon    HX ORTHOPAEDIC      Lumbar fusion    CO BREAST SURGERY PROCEDURE UNLISTED  06/30/2012    Lumpectomy x2    CO BREAST SURGERY PROCEDURE UNLISTED  06/30/2012    Mastectomy bilateral      Prior to Admission medications    Medication Sig Start Date End Date Taking? Authorizing Provider   verapamiL (CALAN) 40 mg tablet Take 120 mg by mouth.    Yes Provider, Historical     Current Facility-Administered Medications   Medication Dose Route Frequency    sodium chloride (NS) flush 5-40 mL  5-40 mL IntraVENous Q8H    sodium chloride (NS) flush 5-40 mL  5-40 mL IntraVENous PRN    acetaminophen (TYLENOL) tablet 650 mg  650 mg Oral Q6H PRN    Or    acetaminophen (TYLENOL) suppository 650 mg  650 mg Rectal Q6H PRN    polyethylene glycol (MIRALAX) packet 17 g  17 g Oral DAILY PRN    ondansetron (ZOFRAN ODT) tablet 4 mg  4 mg Oral Q8H PRN    Or    ondansetron (ZOFRAN) injection 4 mg  4 mg IntraVENous Q6H PRN    [START ON 7/11/2021] enoxaparin (LOVENOX) injection 40 mg  40 mg SubCUTAneous DAILY    HYDROmorphone (DILAUDID) syringe 0.2 mg  0.2 mg IntraVENous Q3H PRN    [START ON 7/11/2021] verapamil ER (CALAN-SR) tablet 120 mg  120 mg Oral DAILY WITH BREAKFAST    [START ON 7/11/2021] escitalopram oxalate (LEXAPRO) tablet 10 mg  10 mg Oral DAILY    [START ON 7/11/2021] predniSONE (DELTASONE) tablet 10 mg  10 mg Oral DAILY WITH BREAKFAST    lidocaine (PF) (XYLOCAINE) 10 mg/mL (1 %) injection 0.1 mL  0.1 mL SubCUTAneous PRN    lactated Ringers infusion  125 mL/hr IntraVENous CONTINUOUS    sodium chloride (NS) flush 5-40 mL  5-40 mL IntraVENous Q8H    sodium chloride (NS) flush 5-40 mL  5-40 mL IntraVENous PRN    naloxone (NARCAN) injection 0.04 mg  0.04 mg IntraVENous Multiple    flumazeniL (ROMAZICON) 0.1 mg/mL injection 0.2 mg  0.2 mg IntraVENous Multiple    ceFAZolin 200 mg/5 mL in NS (from 2 g bag) (ANCEF) IVPB for Graded Allergy Challenge 0.2 g  200 mg IntraVENous ONCE    Followed by   [place] [facility] ceFAZolin 1.8 g/45 mL in NS (from 2 g/50 mL bag) (ANCEF) IVPB bag for Graded Allergy Challenge-2nd dose 1.8 g  1.8 g IntraVENous ONCE    fentaNYL citrate (PF) injection 25 mcg  25 mcg IntraVENous Q4H PRN      Allergies   Allergen Reactions    Latex Hives    Penicillins Unknown (comments)      Social History     Tobacco Use    Smoking status: Never Smoker    Smokeless tobacco: Never Used   Substance Use Topics    Alcohol use: Not on file      History reviewed. No pertinent family history. Review of Systems  A comprehensive review of systems was negative except for that written in the HPI.         Objective:     Patient Vitals for the past 8 hrs:   BP Temp Pulse Resp SpO2 Height Weight   07/10/21 1715 (!) 162/50  76 16      07/10/21 1630 (!) 157/57  71 11      07/10/21 1545 (!) 185/61  72 17      07/10/21 1530 (!) 178/53  72 11      07/10/21 1352 (!) 179/58 98.2 °F (36.8 °C) 70 18 94 % 5' 2\" (1.575 m) 67.1 kg (148 lb)     Temp (24hrs), Av.2 °F (36.8 °C), Min:98.2 °F (36.8 °C), Max:98.2 °F (36.8 °C)      Gen: Well-developed,  in no acute distress   HEENT: Pink conjunctivae  Neck: Supple, without obvious masses  Resp: No accessory muscle use, no acute respiratory distress   Card: RRR  Abd: Supple  Lymph: No obvious lymphadenopathy of the affectedv extremity  Musculo-skeletal : left hip pain with motion  EHL/PF/DF intact  bcr   Skin: No skin breakdown noted. Neuro: Cranial nerves are grossly intact,follows commands appropriately   Psych: Alert and oriented x 3      LABS :    Recent Labs     07/10/21  1407   HGB 12.5   HCT 37.0   INR 1.0      K 3.8      CO2 28   BUN 16   CREA 0.78   *       X-RAYS : left IT fracture  Assessment:   Left IT fracture      Plan:   Proceed with surgical intervention without contraindications. I discussed surgical indications and alternatives with the patient. Risks including infection, bleeding, damage to other structure, need for further surgery and the risks of anesthesia have been discussed with the patient. Verbal and written consent were obtained.      TOR for IMN  R/v/i d/w patient and daughter

## 2021-07-10 NOTE — ANESTHESIA PREPROCEDURE EVALUATION
Anesthetic History   No history of anesthetic complications            Review of Systems / Medical History  Patient summary reviewed and pertinent labs reviewed    Pulmonary  Within defined limits                 Neuro/Psych   Within defined limits           Cardiovascular    Hypertension                   GI/Hepatic/Renal  Within defined limits              Endo/Other  Within defined limits           Other Findings   Comments: Hip fracture after mechanical fall  Chronic steroid use           Physical Exam    Airway  Mallampati: III      Mouth opening: Diminished (comment)     Cardiovascular    Rhythm: regular  Rate: normal         Dental    Dentition: Lower dentition intact and Upper dentition intact     Pulmonary  Breath sounds clear to auscultation               Abdominal  GI exam deferred       Other Findings            Anesthetic Plan    ASA: 3  Anesthesia type: general          Induction: Intravenous  Anesthetic plan and risks discussed with: Patient and Family

## 2021-07-10 NOTE — ED PROVIDER NOTES
The history is provided by the patient and a relative. Hip Injury   This is a new problem. The current episode started less than 1 hour ago. The problem occurs constantly. The problem has not changed since onset. The pain is present in the left hip. The quality of the pain is described as dull. The pain is moderate. Associated symptoms include limited range of motion. Pertinent negatives include no numbness, no stiffness, no tingling, no itching, no back pain and no neck pain. The symptoms are aggravated by contact, movement and palpation. The treatment provided significant relief. There has been a history of trauma. No past medical history on file. No past surgical history on file. No family history on file. Social History     Socioeconomic History    Marital status: Not on file     Spouse name: Not on file    Number of children: Not on file    Years of education: Not on file    Highest education level: Not on file   Occupational History    Not on file   Tobacco Use    Smoking status: Not on file   Substance and Sexual Activity    Alcohol use: Not on file    Drug use: Not on file    Sexual activity: Not on file   Other Topics Concern    Not on file   Social History Narrative    Not on file     Social Determinants of Health     Financial Resource Strain:     Difficulty of Paying Living Expenses:    Food Insecurity:     Worried About Running Out of Food in the Last Year:     [address] in the Last Year:    Transportation Needs:     Lack of Transportation (Medical):      Lack of Transportation (Non-Medical):    Physical Activity:     Days of Exercise per Week:     Minutes of Exercise per Session:    Stress:     Feeling of Stress :    Social Connections:     Frequency of Communication with Friends and Family:     Frequency of Social Gatherings with Friends and Family:     Attends Rastafari Services:     Active Member of Clubs or Organizations:     Attends Club or Organization Meetings:     Marital Status:    Intimate Partner Violence:     Fear of Current or Ex-Partner:     Emotionally Abused:     Physically Abused:     Sexually Abused: ALLERGIES: Latex and Penicillins    Review of Systems   Constitutional: Negative for activity change, chills and fever. HENT: Negative for nosebleeds, sore throat, trouble swallowing and voice change. Eyes: Negative for visual disturbance. Respiratory: Negative for shortness of breath. Cardiovascular: Negative for chest pain and palpitations. Gastrointestinal: Negative for abdominal pain, constipation, diarrhea and nausea. Genitourinary: Negative for difficulty urinating, dysuria, hematuria and urgency. Musculoskeletal: Positive for arthralgias. Negative for back pain, neck pain, neck stiffness and stiffness. Skin: Negative for color change and itching. Allergic/Immunologic: Negative for immunocompromised state. Neurological: Negative for dizziness, tingling, seizures, syncope, weakness, light-headedness, numbness and headaches. Psychiatric/Behavioral: Negative for behavioral problems, confusion, hallucinations, self-injury and suicidal ideas. Vitals:    07/10/21 1352   BP: (!) 179/58   Pulse: 70   Resp: 18   Temp: 98.2 °F (36.8 °C)   SpO2: 94%   Weight: 67.1 kg (148 lb)   Height: 5' 2\" (1.575 m)            Physical Exam  Vitals and nursing note reviewed. Constitutional:       General: She is not in acute distress. Appearance: She is well-developed. She is not diaphoretic. HENT:      Head: Normocephalic and atraumatic. Eyes:      Pupils: Pupils are equal, round, and reactive to light. Cardiovascular:      Rate and Rhythm: Normal rate and regular rhythm. Heart sounds: Normal heart sounds. No murmur heard. No friction rub. No gallop. Pulmonary:      Effort: Pulmonary effort is normal. No respiratory distress. Breath sounds: Normal breath sounds. No wheezing.    Abdominal: General: Bowel sounds are normal. There is no distension. Palpations: Abdomen is soft. Tenderness: There is no abdominal tenderness. There is no guarding or rebound. Musculoskeletal:      Left shoulder: Normal.      Left elbow: Normal.      Cervical back: Full passive range of motion without pain, normal range of motion and neck supple. No pain with movement, spinous process tenderness or muscular tenderness. Left hip: Deformity, tenderness and bony tenderness present. Decreased range of motion. Skin:     General: Skin is warm. Findings: No rash. Neurological:      Mental Status: She is alert and oriented to person, place, and time. Psychiatric:         Behavior: Behavior normal.         Thought Content: Thought content normal.         Judgment: Judgment normal.          MDM     This is a 28-year-old female with past medical history, review of systems, physical exam as above, presenting with complaints of left hip pain secondary to ground-level fall. Family at bedside states the patient was ambulating with the use of her walker, turned to the left and fell on her left side. Patient endorses immediate onset of pain that has been been improving since she received 100 mcg of fentanyl from EMS while in route. Patient also received Zofran. She denies other complaints, denies a history of the same, family states she has chronic dizziness, end[place]. Patient states pain is currently well controlled while not moving. Physical exam is remarkable for well-appearing elderly female, in no acute distress, with shortened and externally rotated left lower extremity, tenderness over the left hip, distal pulse motor and sensation intact.   She is noted to have clear breath sounds, regular rate and rhythm without murmurs gallops or rubs, no cervical spine tenderness to palpation, atraumatic head exam.  Patient states she did strike her head, denies loss of consciousness, denies use of anticoagulants. Patient is noted to be hypertensive, afebrile without tachycardia, satting well on room air. Suspect left femur fracture. Plan to obtain preop lab work and chest x-ray, EKG, plain films of the left hip and left femur. We will reassess, and make a disposition, however anticipate the patient will require admission for further care and evaluation. Procedures    Perfect Serve Consult for Admission  3:05 PM    ED Room Number: ER07/07  Patient Name and age:  Candice Diaz 80 y.o.  female  Working Diagnosis:   1. Closed fracture of left hip, initial encounter ([facility] [address]    2.  Hypertension, unspecified type        COVID-19 Suspicion:  no  Sepsis present:  no  Reassessment needed: no  Code Status:  Full Code  Readmission: no  Isolation Requirements:  no  Recommended Level of Care:  med/surg  Department:[facility] - [phone]  Other:  D/w Dr. Sahara Lin

## 2021-07-10 NOTE — ED NOTES
TRANSFER - OUT REPORT:    Verbal report given to Lacho Gutierrez (name) on Howard Peters  being transferred to Pre OP (unit) for routine progression of care       Report consisted of patients Situation, Background, Assessment and   Recommendations(SBAR). Information from the following report(s) SBAR, Kardex, ED Summary and MAR was reviewed with the receiving nurse. Lines:   Peripheral IV 07/10/21 Right Arm (Active)   Site Assessment Clean, dry, & intact 07/10/21 1356   Phlebitis Assessment 0 07/10/21 1356   Infiltration Assessment 0 07/10/21 1356   Dressing Status Clean, dry, & intact 07/10/21 1356   Dressing Type Transparent 07/10/21 1356   Hub Color/Line Status Blue 07/10/21 1356       Peripheral IV 07/10/21 Right; Lower Forearm (Active)   Site Assessment Clean, dry, & intact 07/10/21 1411   Phlebitis Assessment 0 07/10/21 1411   Infiltration Assessment 0 07/10/21 1411   Dressing Status Clean, dry, & intact 07/10/21 1411   Dressing Type Tape;Transparent 07/10/21 1411   Hub Color/Line Status Pink;Flushed 07/10/21 1411   Action Taken Blood drawn 07/10/21 1411        Opportunity for questions and clarification was provided.       Patient transported with:   Registered Nurse

## 2021-07-10 NOTE — ACP (ADVANCE CARE PLANNING)
[address] Adult  Hospitalist Group                                      Advance Care Planning Note    Name: George Lopez  YOB: 1929  MRN: [id number]  Admission Date: 7/10/2021  1:46 PM    Date of discussion: 7/10/2021    Active Diagnoses:    Hospital Problems  Never Reviewed        Codes Class Noted POA    Hip fracture [facility]) ICD-10-CM: S72.009A  ICD-9-CM: 820.8  7/10/2021 Unknown              These active diagnoses are of sufficient risk that focused discussion on advance care planning is indicated in order to allow the patient to thoughtfully consider personal goals of care, and if situations arise that prevent the ability to personally give input, to ensure appropriate representation of their personal desires for different levels and aggressiveness of care. Discussion:     Persons present and participating in discussion: George LopezRadha MD, son, daughter in law    Topics Discussed:  Patient's medical condition and diagnosis: [x  ] yes [  ] no   Surrogate decision maker: [x  ] yes [  ] no   Patient's current physical function/cognitive function/frailty: [  x] yes [  ] no   Code Status: [ x ] yes [  ] no   Artificial Nutrition / Dialysis / Non-Invasive Ventilation / Blood Transfusion: [ x ] yes [  ] no  Potential Resources for home (durable medical equipment, home nursing, home O2): [x  ] yes [  ] no    Overview of Discussion: Discussed advance directives, which she has present with her in there ER. Discussed decision makers. She would not like resuscitative measures and understands risks of surgery    Time Spent:     Total time spent face-to-face in education and discussion: 16 minutes.      Radha Brannon MD  Date of Service:  7/10/2021  6:09 PM

## 2021-07-11 LAB
ANION GAP SERPL CALC-SCNC: 5 MMOL/L (ref 5–15)
ATRIAL RATE: 70 BPM
BUN SERPL-MCNC: 15 MG/DL (ref 6–20)
BUN/CREAT SERPL: 20 (ref 12–20)
CALCIUM SERPL-MCNC: 8.2 MG/DL (ref 8.5–10.1)
CALCULATED P AXIS, ECG09: 49 DEGREES
CALCULATED R AXIS, ECG10: -43 DEGREES
CALCULATED T AXIS, ECG11: 2 DEGREES
CHLORIDE SERPL-SCNC: 108 MMOL/L (ref 97–108)
CO2 SERPL-SCNC: 26 MMOL/L (ref 21–32)
CREAT SERPL-MCNC: 0.74 MG/DL (ref 0.55–1.02)
DIAGNOSIS, 93000: NORMAL
ERYTHROCYTE [DISTWIDTH] IN BLOOD BY AUTOMATED COUNT: 13.2 % (ref 11.5–14.5)
GLUCOSE SERPL-MCNC: 152 MG/DL (ref 65–100)
HCT VFR BLD AUTO: 33.7 % (ref 35–47)
HGB BLD-MCNC: 10.7 G/DL (ref 11.5–16)
MCH RBC QN AUTO: 30.4 PG (ref 26–34)
MCHC RBC AUTO-ENTMCNC: 31.8 G/DL (ref 30–36.5)
MCV RBC AUTO: 95.7 FL (ref 80–99)
NRBC # BLD: 0 K/UL (ref 0–0.01)
NRBC BLD-RTO: 0 PER 100 WBC
P-R INTERVAL, ECG05: 164 MS
PLATELET # BLD AUTO: 211 K/UL (ref 150–400)
PMV BLD AUTO: 9.1 FL (ref 8.9–12.9)
POTASSIUM SERPL-SCNC: 4.3 MMOL/L (ref 3.5–5.1)
Q-T INTERVAL, ECG07: 426 MS
QRS DURATION, ECG06: 92 MS
QTC CALCULATION (BEZET), ECG08: 460 MS
RBC # BLD AUTO: 3.52 M/UL (ref 3.8–5.2)
SODIUM SERPL-SCNC: 139 MMOL/L (ref 136–145)
VENTRICULAR RATE, ECG03: 70 BPM
WBC # BLD AUTO: 12.6 K/UL (ref 3.6–11)

## 2021-07-11 PROCEDURE — 97530 THERAPEUTIC ACTIVITIES: CPT

## 2021-07-11 PROCEDURE — 74011250636 HC RX REV CODE- 250/636: Performed by: ORTHOPAEDIC SURGERY

## 2021-07-11 PROCEDURE — 85027 COMPLETE CBC AUTOMATED: CPT

## 2021-07-11 PROCEDURE — 74011250637 HC RX REV CODE- 250/637: Performed by: ORTHOPAEDIC SURGERY

## 2021-07-11 PROCEDURE — 65270000029 HC RM PRIVATE

## 2021-07-11 PROCEDURE — 97530 THERAPEUTIC ACTIVITIES: CPT | Performed by: OCCUPATIONAL THERAPIST

## 2021-07-11 PROCEDURE — 36415 COLL VENOUS BLD VENIPUNCTURE: CPT

## 2021-07-11 PROCEDURE — 51798 US URINE CAPACITY MEASURE: CPT

## 2021-07-11 PROCEDURE — 80048 BASIC METABOLIC PNL TOTAL CA: CPT

## 2021-07-11 PROCEDURE — 97161 PT EVAL LOW COMPLEX 20 MIN: CPT

## 2021-07-11 PROCEDURE — 97165 OT EVAL LOW COMPLEX 30 MIN: CPT | Performed by: OCCUPATIONAL THERAPIST

## 2021-07-11 PROCEDURE — 74011636637 HC RX REV CODE- 636/637: Performed by: ORTHOPAEDIC SURGERY

## 2021-07-11 PROCEDURE — 74011250637 HC RX REV CODE- 250/637: Performed by: PHYSICIAN ASSISTANT

## 2021-07-11 PROCEDURE — 77030005513 HC CATH URETH FOL11 MDII -B

## 2021-07-11 PROCEDURE — 97535 SELF CARE MNGMENT TRAINING: CPT | Performed by: OCCUPATIONAL THERAPIST

## 2021-07-11 PROCEDURE — 74011000250 HC RX REV CODE- 250: Performed by: ORTHOPAEDIC SURGERY

## 2021-07-11 PROCEDURE — 2709999900 HC NON-CHARGEABLE SUPPLY

## 2021-07-11 RX ORDER — PREDNISONE 5 MG/1
5 TABLET ORAL
Status: DISCONTINUED | OUTPATIENT
Start: 2021-07-12 | End: 2021-07-13 | Stop reason: HOSPADM

## 2021-07-11 RX ORDER — TRAMADOL HYDROCHLORIDE 50 MG/1
50 TABLET ORAL
Status: DISCONTINUED | OUTPATIENT
Start: 2021-07-11 | End: 2021-07-13 | Stop reason: HOSPADM

## 2021-07-11 RX ADMIN — TRAMADOL HYDROCHLORIDE 50 MG: 50 TABLET, FILM COATED ORAL at 23:35

## 2021-07-11 RX ADMIN — TRAMADOL HYDROCHLORIDE 50 MG: 50 TABLET, FILM COATED ORAL at 17:42

## 2021-07-11 RX ADMIN — DOCUSATE SODIUM 50MG AND SENNOSIDES 8.6MG 1 TABLET: 8.6; 5 TABLET, FILM COATED ORAL at 09:18

## 2021-07-11 RX ADMIN — ACETAMINOPHEN 650 MG: 325 TABLET ORAL at 23:35

## 2021-07-11 RX ADMIN — ACETAMINOPHEN 650 MG: 325 TABLET ORAL at 00:50

## 2021-07-11 RX ADMIN — CEFAZOLIN 2 G: 1 INJECTION, POWDER, FOR SOLUTION INTRAMUSCULAR; INTRAVENOUS at 09:19

## 2021-07-11 RX ADMIN — DOCUSATE SODIUM 50MG AND SENNOSIDES 8.6MG 1 TABLET: 8.6; 5 TABLET, FILM COATED ORAL at 17:26

## 2021-07-11 RX ADMIN — ESCITALOPRAM OXALATE 10 MG: 10 TABLET ORAL at 09:19

## 2021-07-11 RX ADMIN — CEFAZOLIN 2 G: 1 INJECTION, POWDER, FOR SOLUTION INTRAMUSCULAR; INTRAVENOUS at 00:50

## 2021-07-11 RX ADMIN — TRAMADOL HYDROCHLORIDE 50 MG: 50 TABLET, FILM COATED ORAL at 09:18

## 2021-07-11 RX ADMIN — VERAPAMIL HYDROCHLORIDE 120 MG: 120 TABLET, FILM COATED, EXTENDED RELEASE ORAL at 09:18

## 2021-07-11 RX ADMIN — Medication 1 TABLET: at 17:26

## 2021-07-11 RX ADMIN — ACETAMINOPHEN 650 MG: 325 TABLET ORAL at 12:19

## 2021-07-11 RX ADMIN — ENOXAPARIN SODIUM 30 MG: 30 INJECTION SUBCUTANEOUS at 20:59

## 2021-07-11 RX ADMIN — HYDROMORPHONE HYDROCHLORIDE 0.2 MG: 1 INJECTION, SOLUTION INTRAMUSCULAR; INTRAVENOUS; SUBCUTANEOUS at 06:35

## 2021-07-11 RX ADMIN — Medication 10 ML: at 17:27

## 2021-07-11 RX ADMIN — CEFAZOLIN 2 G: 1 INJECTION, POWDER, FOR SOLUTION INTRAMUSCULAR; INTRAVENOUS at 17:26

## 2021-07-11 RX ADMIN — PREDNISONE 10 MG: 5 TABLET ORAL at 09:19

## 2021-07-11 RX ADMIN — ACETAMINOPHEN 650 MG: 325 TABLET ORAL at 06:35

## 2021-07-11 RX ADMIN — POLYETHYLENE GLYCOL 3350 17 G: 17 POWDER, FOR SOLUTION ORAL at 09:19

## 2021-07-11 RX ADMIN — Medication 1 TABLET: at 09:18

## 2021-07-11 RX ADMIN — Medication 10 ML: at 21:00

## 2021-07-11 RX ADMIN — ACETAMINOPHEN 650 MG: 325 TABLET ORAL at 17:26

## 2021-07-11 RX ADMIN — Medication 10 ML: at 06:35

## 2021-07-11 RX ADMIN — ENOXAPARIN SODIUM 30 MG: 30 INJECTION SUBCUTANEOUS at 09:19

## 2021-07-11 RX ADMIN — Medication 1 TABLET: at 12:19

## 2021-07-11 NOTE — PROGRESS NOTES
Problem: Mobility Impaired (Adult and Pediatric)  Goal: *Acute Goals and Plan of Care (Insert Text)  Description: FUNCTIONAL STATUS PRIOR TO ADMISSION: Patient was modified independent using a rolling walker for functional mobility. HOME SUPPORT PRIOR TO ADMISSION: Patient is from [place]. Has been visiting family in [place] and plans to move here in September. Pt lives in [address] [place] [address] in [place]. They will not take her back unless she is fully independent. Physical Therapy Goals  Initiated 7/11/2021  1. Patient will move from supine to sit and sit to supine , scoot up and down, and roll side to side in bed with independence within 7 day(s). 2.  Patient will transfer from bed to chair and chair to bed with supervision/set-up using the least restrictive device within 7 day(s). 3.  Patient will perform sit to stand with supervision/set-up within 7 day(s). 4.  Patient will ambulate with minimal assistance/contact guard assist for 25 feet with the least restrictive device within 7 day(s). Outcome: Progressing Towards Goal   PHYSICAL THERAPY EVALUATION  Patient: Candice Diaz (36 y.o. female)  Date: 7/11/2021  Primary Diagnosis: Hip fracture ([facility] [address] [S72.009A]  Procedure(s) (LRB):  FEMORAL INTERTROCHANTERIC NAIL INSERTION LEFT (Left) 1 Day Post-Op   Precautions:   Fall, DNR, WBAT    ASSESSMENT  Based on the objective data described below, the patient presents with L hip pain, decreased ROM, generalized weakness, impaired standing balance/tolerance and inability to ambulate following L hip fracture. S/p IT nailing POD#1. Pt offers good effort and able ambulate 2ft with Mod A this day. Mod A for bed mobility and Min-Mod A to stand from EOB. Pt with high fear of falling. Pt's family is preparing their house for her to move-in with them(ramp, setting up bedroom). Will benefit from rehab placement short term. Current Level of Function Impacting Discharge (mobility/balance):  Mod A to manage ambulation    Functional Outcome Measure: The patient scored 2/28 on the Tinetti outcome measure which is indicative of high fall risk. Other factors to consider for discharge: IT nailing, from ILF, ambulatory PTA, pain control     Patient will benefit from skilled therapy intervention to address the above noted impairments. PLAN :  Recommendations and Planned Interventions: bed mobility training, transfer training, gait training, therapeutic exercises, neuromuscular re-education, patient and family training/education, and therapeutic activities      Frequency/Duration: Patient will be followed by physical therapy:  twice daily to address goals. Recommendation for discharge: (in order for the patient to meet his/her long term goals)  Therapy 3 hours per day 5-7 days per week    This discharge recommendation:  Has been made in collaboration with the attending provider and/or case management    IF patient discharges home will need the following DME: bedside commode         SUBJECTIVE:   Patient stated It doesn't;t hurt anything like it did yesterday.     OBJECTIVE DATA SUMMARY:   HISTORY:    Past Medical History:   Diagnosis Date    Arthritis     Cancer ([facility] [address]     Chronic pain     Hypertension     Ill-defined condition     Macular degeneration    Ill-defined condition     Vertigo     Past Surgical History:   Procedure Laterality Date    HX ORTHOPAEDIC Right     achilles tendon    HX ORTHOPAEDIC      Lumbar fusion    MS BREAST SURGERY PROCEDURE UNLISTED  06/30/2012    Lumpectomy x2    MS BREAST SURGERY PROCEDURE UNLISTED  06/30/2012    Mastectomy bilateral       Personal factors and/or comorbidities impacting plan of care: arthritis, cancer, chronic pain, HTN    Home Situation  Home Environment: Private residence  # Steps to Enter: 1  One/Two Story Residence: One story  Living Alone: No  Support Systems: Child(damion), Family member(s)  Patient Expects to be Discharged to[de-identified] [employer]  Current DME Used/Available at Home: Rolando Hernandez Lyman Crisp, straight, Walker, rollator, Shower chair, Grab bars  Tub or Shower Type: Shower    EXAMINATION/PRESENTATION/DECISION MAKING:   Critical Behavior:  Neurologic State: Alert, Appropriate for age  Orientation Level: Oriented X4  Cognition: Appropriate decision making, Appropriate for age attention/concentration, Follows commands  Safety/Judgement: Awareness of environment, Fall prevention, Insight into deficits  Hearing: Auditory  Auditory Impairment: None  Skin:    Edema:   Range Of Motion:  AROM: Generally decreased, functional           PROM: Generally decreased, functional           Strength:    Strength: Generally decreased, functional                    Tone & Sensation:   Tone: Normal              Sensation: Intact               Coordination:  Coordination: Within functional limits  Vision:   Acuity: Able to read clock/calendar on wall without difficulty; Able to read employee name badge without difficulty  Corrective Lenses: Reading glasses  Functional Mobility:  Bed Mobility:  Rolling: Moderate assistance; Additional time;Assist x1  Supine to Sit: Moderate assistance; Additional time;Assist x1     Scooting: Moderate assistance; Additional time;Assist x1  Transfers:  Sit to Stand: Moderate assistance; Additional time;Assist x1  Stand to Sit: Minimum assistance; Additional time;Assist x1        Bed to Chair: Minimum assistance; Additional time;Assist x2              Balance:   Sitting: Intact  Standing: Impaired; With support  Standing - Static: Fair;Constant support  Standing - Dynamic : Fair;Constant support  Ambulation/Gait Training:  Distance (ft): 2 Feet (ft)  Assistive Device: Gait belt;Walker, rolling  Ambulation - Level of Assistance: Moderate assistance        Gait Abnormalities: Antalgic;Decreased step clearance; Step to gait        Base of Support: Widened;Shift to right  Stance: Left decreased  Speed/Sonia: Pace decreased (<100 feet/min); Slow  Step Length: Right shortened;Left shortened  Swing Pattern: Left asymmetrical              Functional Measure:  Tinetti test:    Sitting Balance: 1  Arises: 0  Attempts to Rise: 0  Immediate Standing Balance: 0  Standing Balance: 1  Nudged: 0  Eyes Closed: 0  Turn 360 Degrees - Continuous/Discontinuous: 0  Turn 360 Degrees - Steady/Unsteady: 0  Sitting Down: 0  Balance Score: 2 Balance total score  Indication of Gait: 0  R Step Length/Height: 0  L Step Length/Height: 0  R Foot Clearance: 0  L Foot Clearance: 0  Step Symmetry: 0  Step Continuity: 0  Path: 0  Trunk: 0  Walking Time: 0  Gait Score: 0 Gait total score  Total Score: 2/28 Overall total score         Tinetti Tool Score Risk of Falls  <19 = High Fall Risk  19-24 = Moderate Fall Risk  25-28 = Low Fall Risk  Tinetti ME. Performance-Oriented Assessment of Mobility Problems in Elderly Patients. [address]; [id number].  (Scoring Description: PT Bulletin Feb. 10, 1993)    Older adults: Edgar Silvestre et al, 2009; n = 1000 [facility] elderly evaluated with ABC, KRISH, ADL, and IADL)  · Mean KRISH score for males aged 69-68 years = 26.21(3.40)  · Mean KRISH score for females age 69-68 years = 25.16(4.30)  · Mean KRISH score for males over 80 years = 23.29(6.02)  · Mean KRISH score for females over 80 years = 17.20(8.32)            Physical Therapy Evaluation Charge Determination   History Examination Presentation Decision-Making   MEDIUM  Complexity : 1-2 comorbidities / personal factors will impact the outcome/ POC  MEDIUM Complexity : 3 Standardized tests and measures addressing body structure, function, activity limitation and / or participation in recreation  LOW Complexity : Stable, uncomplicated  Other outcome measures Tinetti  LOW       Based on the above components, the patient evaluation is determined to be of the following complexity level: LOW     Pain Ratin/10    Activity Tolerance:   Fair, requires rest breaks, and observed SOB with activity    After treatment patient left in no apparent distress:   Sitting in chair, Call bell within reach, Bed / chair alarm activated, and Caregiver / family present    COMMUNICATION/EDUCATION:   The patients plan of care was discussed with: Registered nurse. Fall prevention education was provided and the patient/caregiver indicated understanding., Patient/family have participated as able in goal setting and plan of care. , and Patient/family agree to work toward stated goals and plan of care.     Thank you for this referral.  Diana Stewart, PT   Time Calculation: 30 mins

## 2021-07-11 NOTE — PROGRESS NOTES
[facility] [place] [facility]  [facility], [address] [place] [address]  [phone]      Medical Progress Note      NAME: Thong Lujan   :  1929  MRM:  [id number]    Date of service: 2021  7:03 AM       Assessment and Plan:   1.  L hip fx: Intertrochanteric fracture of the proximal left femur sustained after mechanical fall. S/p FEMORAL INTERTROCHANTERIC NAIL INSERTION on 7/10. Pain management. May need SNF              2.  Chronic Steroid Use: pt stated that she is taking it for arthritis and dosen't clementina it helps and pt and her daughter are ok to taper of off it. 3.  HTN: On verapamil      4. Urinary retention. Has about 800 ml of urine this morning. Straight catheterization was done. If continue to have retention, will put rodrigues catheter. Subjective:     Chief Complaint[de-identified] Patient was seen and examined as a follow up for hip fracture. Chart was reviewed. feels well. No pain     ROS:  (bold if positive, if negative)    Tolerating PT  Tolerating Diet        Objective:     Last 24hrs VS reviewed since prior progress note.  Most recent are:    Visit Vitals  /71 (BP 1 Location: Right arm, BP Patient Position: At rest)   Pulse 63   Temp 97.6 °F (36.4 °C)   Resp 17   Ht 5' 2\" (1.575 m)   Wt 67.1 kg (148 lb)   SpO2 95%   BMI 27.07 kg/m²     SpO2 Readings from Last 6 Encounters:   21 95%    O2 Flow Rate (L/min): 2 l/min       Intake/Output Summary (Last 24 hours) at 2021 0703  Last data filed at 7/10/2021 2038  Gross per 24 hour   Intake 1025 ml   Output    Net 1025 ml        Physical Exam:    Gen:  Well-developed, well-nourished, in no acute distress  HEENT:  Pink conjunctivae, PERRL, hearing intact to voice, moist mucous membranes  Neck:  Supple, without masses, thyroid non-tender  Resp:  No accessory muscle use, clear breath sounds without wheezes rales or rhonchi  Card:  No murmurs, normal S1, S2 without thrills, bruits or peripheral edema  Abd: Soft, non-tender, non-distended, normoactive bowel sounds are present, no palpable organomegaly and no detectable hernias  Lymph:  No cervical or inguinal adenopathy  Musc:  No cyanosis or clubbing  Skin:  No rashes or ulcers, skin turgor is good  Neuro:  Cranial nerves are grossly intact, no focal motor weakness, follows commands appropriately  Psych:  Good insight, oriented to person, place and time, alert  __________________________________________________________________  Medications Reviewed: (see below)  Medications:     Current Facility-Administered Medications   Medication Dose Route Frequency    acetaminophen (TYLENOL) tablet 650 mg  650 mg Oral Q6H PRN    Or    acetaminophen (TYLENOL) suppository 650 mg  650 mg Rectal Q6H PRN    polyethylene glycol (MIRALAX) packet 17 g  17 g Oral DAILY PRN    ondansetron (ZOFRAN ODT) tablet 4 mg  4 mg Oral Q8H PRN    Or    ondansetron (ZOFRAN) injection 4 mg  4 mg IntraVENous Q6H PRN    HYDROmorphone (DILAUDID) syringe 0.2 mg  0.2 mg IntraVENous Q3H PRN    verapamil ER (CALAN-SR) tablet 120 mg  120 mg Oral DAILY WITH BREAKFAST    escitalopram oxalate (LEXAPRO) tablet 10 mg  10 mg Oral DAILY    predniSONE (DELTASONE) tablet 10 mg  10 mg Oral DAILY WITH BREAKFAST    lactated Ringers infusion  125 mL/hr IntraVENous CONTINUOUS    sodium chloride (NS) flush 5-40 mL  5-40 mL IntraVENous PRN    0.9% sodium chloride infusion  125 mL/hr IntraVENous CONTINUOUS    sodium chloride (NS) flush 5-40 mL  5-40 mL IntraVENous Q8H    acetaminophen (TYLENOL) tablet 650 mg  650 mg Oral Q6H    naloxone (NARCAN) injection 0.4 mg  0.4 mg IntraVENous PRN    calcium-vitamin D (OS-MANJEET +D3) 500 mg-200 unit per tablet 1 Tablet  1 Tablet Oral TID WITH MEALS    senna-docusate (PERICOLACE) 8.6-50 mg per tablet 1 Tablet  1 Tablet Oral BID    polyethylene glycol (MIRALAX) packet 17 g  17 g Oral DAILY    [START ON 7/12/2021] bisacodyL (DULCOLAX) suppository 10 mg  10 mg Rectal DAILY PRN  ceFAZolin (ANCEF) 2 g in sterile water (preservative free) 20 mL IV syringe  2 g IntraVENous Q8H    enoxaparin (LOVENOX) injection 30 mg  30 mg SubCUTAneous Q12H    mirabegron ER (MYRBETRIQ) tablet 25 mg (Patient Supplied)  25 mg Oral DAILY        Lab Data Reviewed: (see below)  Lab Review:     Recent Labs     07/11/21  0342 07/10/21  1407   WBC 12.6* 7.4   HGB 10.7* 12.5   HCT 33.7* 37.0    215     Recent Labs     07/11/21  0342 07/10/21  1407    142   K 4.3 3.8    108   CO2 26 28   * 135*   BUN 15 16   CREA 0.74 0.78   CA 8.2* 8.2*   ALB  --  3.2*   TBILI  --  0.7   ALT  --  30   INR  --  1.0     No results found for: GLUCPOC  No results for input(s): PH, PCO2, PO2, HCO3, FIO2 in the last 72 hours. Recent Labs     07/10/21  1407   INR 1.0     All Micro Results     Procedure Component Value Units Date/Time    COVID-19 RAPID TEST [[id number]] Collected: 07/10/21 1721    Order Status: Completed Specimen: Nasopharyngeal Updated: 07/10/21 2178     Specimen source Nasopharyngeal        COVID-19 rapid test Not detected        Comment: Rapid Abbott ID Now       Rapid NAAT:  The specimen is NEGATIVE for SARS-CoV-2, the novel coronavirus associated with COVID-19. Negative results should be treated as presumptive and, if inconsistent with clinical signs and symptoms or necessary for patient management, should be tested with an alternative molecular assay. Negative results do not preclude SARS-CoV-2 infection and should not be used as the sole basis for patient management decisions. This test has been authorized by the FDA under an Emergency Use Authorization (EUA) for use by authorized laboratories. Fact sheet for Healthcare Providers: ConventionUpdate.co.nz  Fact sheet for Patients: ConventionUpdate.co.nz       Methodology: Isothermal Nucleic Acid Amplification               I have reviewed notes of prior 24hr.     Other pertinent lab: Total time spent with patient: 28 I personally reviewed chart, notes, data and current medications in the medical record. I have personally examined and treated the patient at bedside during this period.                  Care Plan discussed with: Patient, Family, Nursing Staff and >50% of time spent in counseling and coordination of care    Discussed:  Care Plan    Prophylaxis:  Lovenox    Disposition:  SNF/LTC           ___________________________________________________    Attending Physician: Charley Wright MD

## 2021-07-11 NOTE — PROGRESS NOTES
ORTHO PROGRESS NOTE      SUBJECTIVE:  Andrés Branch states her pain is controlled with Tylenol. OBJECTIVE:  Patient Vitals for the past 24 hrs:   Temp Pulse BP   07/11/21 0755 98.8 °F (37.1 °C) (!) 59 129/68   07/11/21 0355 97.6 °F (36.4 °C) 63 124/71   07/11/21 0107 97.8 °F (36.6 °C) 77 (!) 155/77   07/11/21 0022 97.5 °F (36.4 °C) 78 (!) 143/78   07/10/21 2317 98.3 °F (36.8 °C) 73 124/70   07/10/21 2228 98.2 °F (36.8 °C) 78 106/64   07/10/21 2106 98 °F (36.7 °C) 78 (!) 150/71   07/10/21 2040  74 137/63   07/10/21 2038 97.8 °F (36.6 °C) 72 (!) 147/61   07/10/21 2030  71 (!) 146/59   07/10/21 2010  78 (!) 171/61   07/10/21 2007  78 (!) 165/58   07/10/21 2005  76 (!) 178/153   07/10/21 2003  76 (!) 165/68   07/10/21 2000  81 (!) 174/102   07/10/21 1955  76 (!) 154/75   07/10/21 1950  77 (!) 163/68   07/10/21 1945 99 °F (37.2 °C) 78 (!) 165/72   07/10/21 1940 99 °F (37.2 °C) 82 (!) 144/62   07/10/21 1846 98.5 °F (36.9 °C) 80 (!) 180/65   07/10/21 1715  76 (!) 162/50   07/10/21 1630  71 (!) 157/57   07/10/21 1545  72 (!) 185/61   07/10/21 1530  72 (!) 178/53   07/10/21 1352 98.2 °F (36.8 °C) 70 (!) 179/58       Alert, oriented, no distress. Lying in bed. Family present. Dressing CDI. Thigh edema but soft. No pain with gentle PROM hip. SILT bilat foot. Calves non-tender. Moves ankles OK. Recent Labs     07/11/21  0342 07/10/21  1407 07/10/21  1407   HGB 10.7*   < > 12.5   HCT 33.7*   < > 37.0      < > 215   INR  --   --  1.0   BUN 15   < > 16   CREA 0.74   < > 0.78   GFRAA >60   < > >60   GFRNA >60   < > >60    < > = values in this interval not displayed. ASSESSMENT:  Procedure: Procedure(s): FEMORAL INTERTROCHANTERIC NAIL INSERTION LEFT  Post Op day: 1 Day Post-Op  stable    PLAN:  PT/OT: WBAT LLE with walker  Analgesics: Tylenol scheduled. Tramadol avail prn. DVT proph: Lovenox 28 days  Disp planning.      SOLITARIO Odom  Orthopedic Trauma Service  [facility]

## 2021-07-11 NOTE — PROGRESS NOTES
Patient having urinary retention. Straight catheter was used this morning at 0605. Patient is dribbling urine but unable to void. Bladder scan showed 675 mL urine. Dr. Leydi Mathias notified. Beverly ordered.

## 2021-07-11 NOTE — PERIOP NOTES
TRANSFER - OUT REPORT:    Verbal report given to car dubois on Felix Odom  being transferred to [facility](unit) for routine post - op       Report consisted of patients Situation, Background, Assessment and   Recommendations(SBAR). Information from the following report(s) SBAR, OR Summary, Procedure Summary, Intake/Output, MAR and Cardiac Rhythm 2 was reviewed with the receiving nurse. Lines:   Peripheral IV 07/10/21 Right; Lower Forearm (Active)   Site Assessment Clean, dry, & intact 07/10/21 2038   Phlebitis Assessment 0 07/10/21 2038   Infiltration Assessment 0 07/10/21 2038   Dressing Status Clean, dry, & intact; Occlusive 07/10/21 2038   Dressing Type Tape;Transparent 07/10/21 2038   Hub Color/Line Status Pink; Infusing 07/10/21 2038   Action Taken Blood drawn 07/10/21 1411        Opportunity for questions and clarification was provided.       Patient transported with:   O2 @ 2 liters  Registered Nurse

## 2021-07-11 NOTE — PROGRESS NOTES
Report received from [facility]. Care assumed at 2300.

## 2021-07-11 NOTE — PROGRESS NOTES
Patient refused to dangled and stated she does not feel safe and does not believe she can use bedside commode. Patient unable to void, bladder scan done, patient retaining 842ml of urine. Straight cath. Done. 850ml urine collected.

## 2021-07-11 NOTE — PROGRESS NOTES
Problem: Self Care Deficits Care Plan (Adult)  Goal: *Acute Goals and Plan of Care (Insert Text)  Description:   FUNCTIONAL STATUS PRIOR TO ADMISSION: Patient was modified independent using a rolling walker for functional mobility. HOME SUPPORT: The patient lived alone in [place], but currently staying with daughter to provide assistance. Occupational Therapy Goals  Initiated 7/11/2021  1. Patient will perform grooming standing at sink with contact guard assist within 7 day(s). 2.  Patient will perform lower body dressing with minimal assistance/contact guard assist within 7 day(s). 3.  Patient will perform toilet transfers with contact guard assist within 7 day(s). 4.  Patient will perform all aspects of toileting with minimal assistance/contact guard assist within 7 day(s). 5.  Patient will participate in upper extremity therapeutic exercise/activities with modified independence for 10 minutes within 7 day(s). 6.  Patient will utilize energy conservation techniques during functional activities with verbal and visual cues within 7 day(s). Outcome: Progressing Towards Goal     OCCUPATIONAL THERAPY EVALUATION  Patient: George Lopez (78 y.o. female)  Date: 7/11/2021  Primary Diagnosis: Hip fracture ([address] [S72.009A]  Procedure(s) (LRB):  FEMORAL INTERTROCHANTERIC NAIL INSERTION LEFT (Left) 1 Day Post-Op   Precautions:  Fall, DNR, WBAT    ASSESSMENT  Based on the objective data described below, the patient presents with decreased strength, endurance, mobility, balance, safety and pain POD 1 L hip IT Nail following fall at home with resulting hip fx. She is currently at an overall max A level with LE ADLs and toileting, mod A for functional mobility near bedside. Recommend IP rehab at discharge. Current Level of Function Impacting Discharge (ADLs/self-care): max A level with LE ADLs and toileting, mod A for functional mobility near bedside    Functional Outcome Measure:   The patient scored 50/100 on the Barthel Index outcome measure. Other factors to consider for discharge: pt's daughter is having house modified for pt to live with her     Patient will benefit from skilled therapy intervention to address the above noted impairments. PLAN :  Recommendations and Planned Interventions: self care training, functional mobility training, therapeutic exercise, balance training, therapeutic activities, endurance activities, patient education, home safety training, and family training/education    Frequency/Duration: Patient will be followed by occupational therapy 5 times a week to address goals. Recommendation for discharge: (in order for the patient to meet his/her long term goals)  Therapy 3 hours per day 5-7 days per week    This discharge recommendation:  Has not yet been discussed the attending provider and/or case management    IF patient discharges home will need the following DME: TBD       SUBJECTIVE:   Patient stated I hurt.     OBJECTIVE DATA SUMMARY:   HISTORY:   Past Medical History:   Diagnosis Date    Arthritis     Cancer [address] [address]     Chronic pain     Hypertension     Ill-defined condition     Macular degeneration    Ill-defined condition     Vertigo     Past Surgical History:   Procedure Laterality Date    HX ORTHOPAEDIC Right     achilles tendon    HX ORTHOPAEDIC      Lumbar fusion    AZ BREAST SURGERY PROCEDURE UNLISTED  06/30/2012    Lumpectomy x2    AZ BREAST SURGERY PROCEDURE UNLISTED  06/30/2012    Mastectomy bilateral       Expanded or extensive additional review of patient history:     Home Situation  Home Environment: Private residence  # Steps to Enter: 1  One/Two Story Residence: One story  Living Alone: No  Support Systems: Child(damion), Family member(s)  Patient Expects to be Discharged to[de-identified] [employer]  Current DME Used/Available at Home: Walker, rolling, Cane, straight, Walker, rollator, Shower chair, Grab bars  Tub or Shower Type: Shower    Hand dominance: Right    EXAMINATION OF PERFORMANCE DEFICITS:  Cognitive/Behavioral Status:  Neurologic State: Alert; Appropriate for age  Orientation Level: Oriented X4  Cognition: Appropriate decision making; Appropriate for age attention/concentration; Follows commands  Perception: Appears intact  Perseveration: No perseveration noted  Safety/Judgement: Awareness of environment; Fall prevention; Insight into deficits    Hearing: Auditory  Auditory Impairment: None    Vision/Perceptual:    Acuity: Able to read clock/calendar on wall without difficulty; Able to read employee name badge without difficulty    Corrective Lenses: Reading glasses    Range of Motion:  AROM: Generally decreased, functional  PROM: Generally decreased, functional                      Strength:  Strength: Generally decreased, functional                Coordination:  Coordination: Within functional limits  Fine Motor Skills-Upper: Left Intact; Right Intact    Gross Motor Skills-Upper: Left Intact; Right Intact    Tone & Sensation:  Tone: Normal  Sensation: Intact                      Balance:  Sitting: Intact  Standing: Impaired; With support  Standing - Static: Fair;Constant support  Standing - Dynamic : Fair;Constant support    Functional Mobility and Transfers for ADLs:  Bed Mobility:  Rolling: Moderate assistance; Additional time;Assist x1  Supine to Sit: Moderate assistance; Additional time;Assist x1  Scooting: Moderate assistance; Additional time;Assist x1    Transfers:  Sit to Stand: Moderate assistance; Additional time;Assist x1  Stand to Sit: Minimum assistance; Additional time;Assist x1  Bed to Chair: Minimum assistance; Additional time;Assist x2  Toilet Transfer : Minimum assistance; Additional time;Assist x2 (RW and BSC)  Assistive Device : Gait Belt;Walker, rolling    ADL Assessment:     Feeding: Independent    Oral Facial Hygiene/Grooming: Setup; Additional time (seated)    Bathing: Moderate assistance; Additional time;Assist x1 (seated- A to reach buttocks and feet)    Upper Body Dressing: Setup    Lower Body Dressing: Maximum assistance; Additional time;Assist x1 (A to reach feet and standing)    Toileting: Maximum assistance; Additional time (A for clothing and hygiene)                ADL Intervention and task modifications:  Cognitive Retraining  Safety/Judgement: Awareness of environment; Fall prevention; Insight into deficits    Bathing: Patient instructed when bathing to not submerge wound in water, stand to shower or sponge bathe, cover wound with plastic and tape to ensure no water reaches bandage/wound without cues. Patient indicated understanding. Dressing joint: Patient instructed and demonstrated to don/doff Left LE first/last max cues. Patient instructed and demonstrated to don all clothing while sitting prior to standing, doff all clothing to knees while standing, then sit to doff clothing off from knees to feet in order to facilitate fall prevention, pain management, and energy conservation with Minimum assistance. Home safety: Patient instructed on home modifications and safety (raise height of ADL objects, appropriate height of chair surfaces, recliner safety, change of floor surfaces, clear pathways) to increase independence and fall prevention. Patient indicated understanding. Standing: Patient instructed and demonstrated to walk up to sink/counter top/surfaces, step into walker to increase safety of joint and fall prevention with Minimum assistance. Patient instructed to increase amount of time standing, observe standing position during ADLs in order to increase even weight bearing through bilateral LEs in order to increase independence with ADLs. Goal to be reached 30 days post - op, per orthopedic surgeon or per PT. Patient indicated understanding. Tub transfer: Patient instructed regarding when it is safe to begin transfer into tub (complete stairs with PT, advance exercises with PT high enough to clear tub height).   Patient instructed to use the same technique as used with stairs when entering and exiting tub (\"up with the non-surgical, down with the surgical leg\"). Patient indicated understanding. Functional Measure:  Barthel Index:    Bathin  Bladder: 5  Bowels: 10  Groomin  Dressin  Feeding: 10  Mobility: 5  Stairs: 0  Toilet Use: 5  Transfer (Bed to Chair and Back): 5  Total: 50/100        The Barthel ADL Index: Guidelines  1. The index should be used as a record of what a patient does, not as a record of what a patient could do. 2. The main aim is to establish degree of independence from any help, physical or verbal, however minor and for whatever reason. 3. The need for supervision renders the patient not independent. 4. A patient's performance should be established using the best available evidence. Asking the patient, friends/relatives and nurses are the usual sources, but direct observation and common sense are also important. However direct testing is not needed. 5. Usually the patient's performance over the preceding 24-48 hours is important, but occasionally longer periods will be relevant. 6. Middle categories imply that the patient supplies over 50 per cent of the effort. 7. Use of aids to be independent is allowed. Breann Munoz., Barthel, DMarianoW. ([id number]). Functional evaluation: the Barthel Index. [facility] (14)2. DEIDRA Wong, Franck Rosenthal., Pedro Marsh., Gerald, [address] [place] [address] (). Measuring the change indisability after inpatient rehabilitation; comparison of the responsiveness of the Barthel Index and Functional [place] Measure. Journal of Neurology, Neurosurgery, and Psychiatry, 66(4), 444-384. Nicole Hilliard, N.EDWARD.A, LETHA Hercules, & Carmen Lerner, M.A. (2004.) Assessment of post-stroke quality of life in cost-effectiveness studies: The usefulness of the Barthel Index and the EuroQoL-5D.  Quality of Life Research, 15, 062-84        Occupational Therapy Evaluation Charge Determination   History Examination Decision-Making   LOW Complexity : Brief history review  HIGH Complexity : 5 or more performance deficits relating to physical, cognitive , or psychosocial skils that result in activity limitations and / or participation restrictions LOW Complexity : No comorbidities that affect functional and no verbal or physical assistance needed to complete eval tasks       Based on the above components, the patient evaluation is determined to be of the following complexity level: LOW   Pain Ratin/10    Activity Tolerance:   Fair and requires frequent rest breaks    After treatment patient left in no apparent distress:    Sitting in chair, Call bell within reach, Bed / chair alarm activated, and Caregiver / family present    COMMUNICATION/EDUCATION:   The patients plan of care was discussed with: Physical therapist and Registered nurse. Home safety education was provided and the patient/caregiver indicated understanding., Patient/family have participated as able in goal setting and plan of care. , and Patient/family agree to work toward stated goals and plan of care. This patients plan of care is appropriate for delegation to [facility].     Thank you for this referral.  Oneyda Rob OT  Time Calculation: 31 mins

## 2021-07-11 NOTE — PROGRESS NOTES
Problem: Falls - Risk of  Goal: *Absence of Falls  Description: Document Gena Murphy Fall Risk and appropriate interventions in the flowsheet. Outcome: Progressing Towards Goal  Note: Fall Risk Interventions:  Mobility Interventions: Bed/chair exit alarm, Patient to call before getting OOB, PT Consult for mobility concerns, PT Consult for assist device competence, Utilize walker, cane, or other assistive device, Utilize gait belt for transfers/ambulation         Medication Interventions: Bed/chair exit alarm, Evaluate medications/consider consulting pharmacy, Patient to call before getting OOB, Teach patient to arise slowly    Elimination Interventions: Bed/chair exit alarm, Call light in reach, Patient to call for help with toileting needs    History of Falls Interventions: Bed/chair exit alarm, Evaluate medications/consider consulting pharmacy, Investigate reason for fall, Room close to nurse's station, Utilize gait belt for transfer/ambulation         Problem: Patient Education: Go to Patient Education Activity  Goal: Patient/Family Education  Outcome: Progressing Towards Goal     Problem: Pressure Injury - Risk of  Goal: *Prevention of pressure injury  Description: Document Darren Scale and appropriate interventions in the flowsheet.   Outcome: Progressing Towards Goal  Note: Pressure Injury Interventions:       Moisture Interventions: Internal/External urinary devices, Absorbent underpads    Activity Interventions: Pressure redistribution bed/mattress(bed type), PT/OT evaluation, Increase time out of bed    Mobility Interventions: Pressure redistribution bed/mattress (bed type), PT/OT evaluation    Nutrition Interventions: Document food/fluid/supplement intake, Offer support with meals,snacks and hydration                     Problem: Patient Education: Go to Patient Education Activity  Goal: Patient/Family Education  Outcome: Progressing Towards Goal     Problem: Patient Education: Go to Patient Education Activity  Goal: Patient/Family Education  Outcome: Progressing Towards Goal     Problem: Patient Education: Go to Patient Education Activity  Goal: Patient/Family Education  Outcome: Progressing Towards Goal

## 2021-07-12 LAB
HGB BLD-MCNC: 9.5 G/DL (ref 11.5–16)
SARS-COV-2, COV2: NORMAL

## 2021-07-12 PROCEDURE — 97535 SELF CARE MNGMENT TRAINING: CPT

## 2021-07-12 PROCEDURE — 85018 HEMOGLOBIN: CPT

## 2021-07-12 PROCEDURE — 97530 THERAPEUTIC ACTIVITIES: CPT

## 2021-07-12 PROCEDURE — 36415 COLL VENOUS BLD VENIPUNCTURE: CPT

## 2021-07-12 PROCEDURE — 97116 GAIT TRAINING THERAPY: CPT

## 2021-07-12 PROCEDURE — U0005 INFEC AGEN DETEC AMPLI PROBE: HCPCS

## 2021-07-12 PROCEDURE — 65270000029 HC RM PRIVATE

## 2021-07-12 PROCEDURE — 74011636637 HC RX REV CODE- 636/637: Performed by: INTERNAL MEDICINE

## 2021-07-12 PROCEDURE — 74011250636 HC RX REV CODE- 250/636: Performed by: ORTHOPAEDIC SURGERY

## 2021-07-12 PROCEDURE — 74011250637 HC RX REV CODE- 250/637: Performed by: PHYSICIAN ASSISTANT

## 2021-07-12 PROCEDURE — 74011250637 HC RX REV CODE- 250/637: Performed by: ORTHOPAEDIC SURGERY

## 2021-07-12 RX ADMIN — Medication 10 ML: at 13:41

## 2021-07-12 RX ADMIN — TRAMADOL HYDROCHLORIDE 50 MG: 50 TABLET, FILM COATED ORAL at 13:14

## 2021-07-12 RX ADMIN — Medication 1 TABLET: at 13:14

## 2021-07-12 RX ADMIN — Medication 10 ML: at 05:49

## 2021-07-12 RX ADMIN — ACETAMINOPHEN 650 MG: 325 TABLET ORAL at 04:05

## 2021-07-12 RX ADMIN — TRAMADOL HYDROCHLORIDE 50 MG: 50 TABLET, FILM COATED ORAL at 21:08

## 2021-07-12 RX ADMIN — Medication 1 TABLET: at 09:18

## 2021-07-12 RX ADMIN — PREDNISONE 5 MG: 5 TABLET ORAL at 09:18

## 2021-07-12 RX ADMIN — DOCUSATE SODIUM 50MG AND SENNOSIDES 8.6MG 1 TABLET: 8.6; 5 TABLET, FILM COATED ORAL at 09:18

## 2021-07-12 RX ADMIN — ENOXAPARIN SODIUM 30 MG: 30 INJECTION SUBCUTANEOUS at 21:08

## 2021-07-12 RX ADMIN — POLYETHYLENE GLYCOL 3350 17 G: 17 POWDER, FOR SOLUTION ORAL at 09:19

## 2021-07-12 RX ADMIN — ACETAMINOPHEN 650 MG: 325 TABLET ORAL at 23:07

## 2021-07-12 RX ADMIN — ESCITALOPRAM OXALATE 10 MG: 10 TABLET ORAL at 09:18

## 2021-07-12 RX ADMIN — ACETAMINOPHEN 650 MG: 325 TABLET ORAL at 18:13

## 2021-07-12 RX ADMIN — VERAPAMIL HYDROCHLORIDE 120 MG: 120 TABLET, FILM COATED, EXTENDED RELEASE ORAL at 09:18

## 2021-07-12 RX ADMIN — Medication 10 ML: at 21:09

## 2021-07-12 RX ADMIN — DOCUSATE SODIUM 50MG AND SENNOSIDES 8.6MG 1 TABLET: 8.6; 5 TABLET, FILM COATED ORAL at 18:13

## 2021-07-12 RX ADMIN — Medication 1 TABLET: at 18:13

## 2021-07-12 RX ADMIN — ACETAMINOPHEN 650 MG: 325 TABLET ORAL at 13:13

## 2021-07-12 RX ADMIN — ENOXAPARIN SODIUM 30 MG: 30 INJECTION SUBCUTANEOUS at 09:19

## 2021-07-12 NOTE — PROGRESS NOTES
Problem: Self Care Deficits Care Plan (Adult)  Goal: *Acute Goals and Plan of Care (Insert Text)  Description:   FUNCTIONAL STATUS PRIOR TO ADMISSION: Patient was modified independent using a rolling walker for functional mobility. HOME SUPPORT: The patient lived alone in [place], but currently staying with daughter to provide assistance. Occupational Therapy Goals  Initiated 7/11/2021  1. Patient will perform grooming standing at sink with contact guard assist within 7 day(s). 2.  Patient will perform lower body dressing with minimal assistance/contact guard assist within 7 day(s). 3.  Patient will perform toilet transfers with contact guard assist within 7 day(s). 4.  Patient will perform all aspects of toileting with minimal assistance/contact guard assist within 7 day(s). 5.  Patient will participate in upper extremity therapeutic exercise/activities with modified independence for 10 minutes within 7 day(s). 6.  Patient will utilize energy conservation techniques during functional activities with verbal and visual cues within 7 day(s). Outcome: Progressing Towards Goal   OCCUPATIONAL THERAPY TREATMENT  Patient: Naila Mcdonnell (30 y.o. female)  Date: 7/12/2021  Diagnosis: Hip fracture ([address] Gissel Jackson <principal problem not specified>  Procedure(s) (LRB):  FEMORAL INTERTROCHANTERIC NAIL INSERTION LEFT (Left) 2 Days Post-Op  Precautions: Fall, DNR, WBAT  Chart, occupational therapy assessment, plan of care, and goals were reviewed. ASSESSMENT  Patient continues with skilled OT services and is progressing towards goals. Pt transfers to chair moderate assist x 2 in prep for ADL's however needs rest break following transfer and educated as to energy conservation techniques, pacing herself. Max assist LB bathe and dress. Daughter present during tx. .  Given poor endurance with upright mobility pt may benefit from slower pace of SNF to allow family time to prepare the home.      Current Level of Function Impacting Discharge (ADLs): max assist LB tasks    Other factors to consider for discharge:          PLAN :  Patient continues to benefit from skilled intervention to address the above impairments. Continue treatment per established plan of care to address goals. Recommend with staff: out of bed for meals, there ex    Recommend next OT session: cont towards goals    Recommendation for discharge: (in order for the patient to meet his/her long term goals)  Therapy up to 5 days/week in SNF setting    This discharge recommendation:  Has not yet been discussed the attending provider and/or case management    IF patient discharges home will need the following DME:        SUBJECTIVE:   Patient stated ok.     OBJECTIVE DATA SUMMARY:   Cognitive/Behavioral Status:  Neurologic State: Alert  Orientation Level: Oriented X4  Cognition: Appropriate decision making; Follows commands             Functional Mobility and Transfers for ADLs:  Bed Mobility:  Rolling: Minimum assistance;Assist x2  Supine to Sit: Minimum assistance;Assist x2    Transfers:  Sit to Stand: Minimum assistance;Assist x2     Bed to Chair: Moderate assistance;Assist x2    Balance:  Sitting: Intact  Standing: Impaired  Standing - Static: Fair;Constant support  Standing - Dynamic : Poor;Constant support    ADL Intervention:     Max assist LB tasks        Activity Tolerance:   Fair    After treatment patient left in no apparent distress:   Sitting in chair    COMMUNICATION/COLLABORATION:   The patients plan of care was discussed with: Physical therapist, Occupational therapist, and Registered nurse.      ASHISH Paz/L  Time Calculation: 20 mins

## 2021-07-12 NOTE — PROGRESS NOTES
Orthopaedic Progress Note  Post Op day: 2 Days Post-Op    2021 12:24 PM     Patient: Avery Llamas MRN: [id number]  SSN: [id number]    YOB: 1929  Age: 80 y.o. Sex: female      Admit date:  7/10/2021  Date of Surgery:  7/10/2021   Procedures:  Procedure(s): FEMORAL INTERTROCHANTERIC NAIL INSERTION LEFT  Admitting Physician:  Jamila Gonsales MD   Surgeon:  Ez Gregory) and Role:     * Tory Recio MD - Primary    Consulting Physician(s): Treatment Team: Attending Provider: Beronica Parrish MD; Consulting Provider: Tory Recio MD; Consulting Provider: Carroll Potts MD; Consulting Provider: SOLITARIO Fang; Primary Nurse: Paty Wallace; Nurse Extern: Andrei Thomas; Staff Nurse: Lucia Velázquez LPN; Utilization Review: Nasir Ruano; Care Manager: Estela Cross    SUBJECTIVE:     Avery Llamas is a 80 y.o. female is 2 Days Post-Op s/p Procedure(s): FEMORAL INTERTROCHANTERIC NAIL INSERTION LEFT with an appropriate level of post-operative pain. She just worked with therapy. She complains of constipation. She denies increasing left hip pain. No complaints of nausea, vomiting, dizziness, lightheadedness, chest pain, or shortness of breath. OBJECTIVE:       Physical Exam:  General: Alert, cooperative, no distress. Respiratory: Respirations unlabored  Neurological:  Neurovascular exam within normal limits. Motor: + DF/PF. Musculoskeletal: Calves soft, supple, non-tender upon palpation. +DP and PT pulses left leg. Dressing/Wound:  Clean, dry and intact. No significant erythema or swelling.       Vital Signs:        Patient Vitals for the past 8 hrs:   BP Temp Pulse Resp SpO2   21 1216 139/67 99.4 °F (37.4 °C) 82 16 90 %   21 0725 (!) 148/65 99.6 °F (37.6 °C) 69 16 92 %                                          Temp (24hrs), Av.9 °F (37.2 °C), Min:97.9 °F (36.6 °C), Max:99.6 °F (37.6 °C)      Labs:        Recent Labs 07/12/21  0428 07/11/21  0342 07/11/21  0342 07/10/21  1407 07/10/21  1407   HCT  --   --  33.7*   < > 37.0   HGB 9.5*   < > 10.7*   < > 12.5   INR  --   --   --   --  1.0    < > = values in this interval not displayed. Lab Results   Component Value Date/Time    Sodium 139 07/11/2021 03:42 AM    Potassium 4.3 07/11/2021 03:42 AM    Chloride 108 07/11/2021 03:42 AM    CO2 26 07/11/2021 03:42 AM    Glucose 152 (H) 07/11/2021 03:42 AM    BUN 15 07/11/2021 03:42 AM    Creatinine 0.74 07/11/2021 03:42 AM    Calcium 8.2 (L) 07/11/2021 03:42 AM       PT/OT:        Gait  Base of Support: Widened, Shift to right  Speed/Sonia: Pace decreased (<100 feet/min), Slow  Step Length: Right shortened, Left shortened  Swing Pattern: Left asymmetrical  Stance: Left decreased  Gait Abnormalities: Antalgic, Decreased step clearance, Step to gait  Ambulation - Level of Assistance: Moderate assistance  Distance (ft): 2 Feet (ft)  Assistive Device: Gait belt, Walker, rolling       Patient mobility  Bed Mobility Training  Rolling: Moderate assistance, Additional time, Assist x1  Supine to Sit: Moderate assistance, Additional time, Assist x1  Scooting: Moderate assistance, Additional time, Assist x1  Transfer Training  Sit to Stand: Moderate assistance, Additional time, Assist x1  Stand to Sit: Minimum assistance, Additional time, Assist x1  Bed to Chair: Minimum assistance, Additional time, Assist x2      Gait Training  Assistive Device: Gait belt, Walker, rolling  Ambulation - Level of Assistance: Moderate assistance  Distance (ft): 2 Feet (ft)            ASSESSMENT / PLAN:   Active Problems:    Hip fracture ([address] (7/10/2021)            A: 1. S/P left hip IM nail POD 2    P: 1. PT/OT: WBAT with therapy. OOB to chair for meals. 2. DVT ppx: Lovenox 40 mg SC for 1 month  3. Analgesia: Tylenol 650mg scheduled. Tramadol 50 mg q 6 hours PRN  4. DISPO: SNF versus rehab  5. Anemia: ABLA. Monitor.     6. F/U with Dr. Alda Mcconnell in 2-3 weeks.     Signed By:  Elizabeth Nye, [address] [place] [address]

## 2021-07-12 NOTE — PROGRESS NOTES
[facility]  [address] [facility], [place], [address] [place] [address]  [phone]      Medical Progress Note      NAME: Darvin Reardon   :  1929  MRM:  [id number]    Date of service: 2021  7:03 AM       Assessment and Plan:   1.  L hip fx: Intertrochanteric fracture of the proximal left femur sustained after mechanical fall. S/p FEMORAL INTERTROCHANTERIC NAIL INSERTION on 7/10. Pain management. Needs SNF              2.  Chronic Steroid Use: pt stated that she is taking it for arthritis and dosen't clementina it helps and pt and her daughter are ok to taper of off it. 3.  HTN: On verapamil      4. Urinary retention. Beverly in place. 5.  Constipation. Bowel regimen. Subjective:     Chief Complaint[de-identified] Patient was seen and examined as a follow up for hip fracture. Chart was reviewed. feels well. Hip pain with movement     ROS:  (bold if positive, if negative)    Tolerating PT  Tolerating Diet        Objective:     Last 24hrs VS reviewed since prior progress note.  Most recent are:    Visit Vitals  BP (!) 148/65 (BP 1 Location: Right arm, BP Patient Position: At rest)   Pulse 69   Temp 99.6 °F (37.6 °C)   Resp 16   Ht 5' 2\" (1.575 m)   Wt 62.5 kg (137 lb 12.6 oz)   SpO2 92%   BMI 25.20 kg/m²     SpO2 Readings from Last 6 Encounters:   21 92%    O2 Flow Rate (L/min): 2 l/min       Intake/Output Summary (Last 24 hours) at 2021 1008  Last data filed at 2021 0939  Gross per 24 hour   Intake 460 ml   Output 1100 ml   Net -640 ml        Physical Exam:    Gen:  Well-developed, well-nourished, in no acute distress  HEENT:  Pink conjunctivae, PERRL, hearing intact to voice, moist mucous membranes  Neck:  Supple, without masses, thyroid non-tender  Resp:  No accessory muscle use, clear breath sounds without wheezes rales or rhonchi  Card:  No murmurs, normal S1, S2 without thrills, bruits or peripheral edema  Abd:  Soft, non-tender, non-distended, normoactive bowel sounds are present, no palpable organomegaly and no detectable hernias  Lymph:  No cervical or inguinal adenopathy  Musc:  No cyanosis or clubbing  Skin:  No rashes or ulcers, skin turgor is good  Neuro:  Cranial nerves are grossly intact, no focal motor weakness, follows commands appropriately  Psych:  Good insight, oriented to person, place and time, alert  __________________________________________________________________  Medications Reviewed: (see below)  Medications:     Current Facility-Administered Medications   Medication Dose Route Frequency    traMADoL (ULTRAM) tablet 50 mg  50 mg Oral Q6H PRN    predniSONE (DELTASONE) tablet 5 mg  5 mg Oral DAILY WITH BREAKFAST    vitamins  A,C,E-zinc-copper 7,160-113-100 unit-mg-unit TbEC 1 Tablet (Patient Supplied)  1 Tablet Oral BID    acetaminophen (TYLENOL) tablet 650 mg  650 mg Oral Q6H PRN    Or    acetaminophen (TYLENOL) suppository 650 mg  650 mg Rectal Q6H PRN    polyethylene glycol (MIRALAX) packet 17 g  17 g Oral DAILY PRN    ondansetron (ZOFRAN ODT) tablet 4 mg  4 mg Oral Q8H PRN    Or    ondansetron (ZOFRAN) injection 4 mg  4 mg IntraVENous Q6H PRN    HYDROmorphone (DILAUDID) syringe 0.2 mg  0.2 mg IntraVENous Q3H PRN    verapamil ER (CALAN-SR) tablet 120 mg  120 mg Oral DAILY WITH BREAKFAST    escitalopram oxalate (LEXAPRO) tablet 10 mg  10 mg Oral DAILY    sodium chloride (NS) flush 5-40 mL  5-40 mL IntraVENous PRN    sodium chloride (NS) flush 5-40 mL  5-40 mL IntraVENous Q8H    acetaminophen (TYLENOL) tablet 650 mg  650 mg Oral Q6H    naloxone (NARCAN) injection 0.4 mg  0.4 mg IntraVENous PRN    calcium-vitamin D (OS-MANJEET +D3) 500 mg-200 unit per tablet 1 Tablet  1 Tablet Oral TID WITH MEALS    senna-docusate (PERICOLACE) 8.6-50 mg per tablet 1 Tablet  1 Tablet Oral BID    polyethylene glycol (MIRALAX) packet 17 g  17 g Oral DAILY    bisacodyL (DULCOLAX) suppository 10 mg  10 mg Rectal DAILY PRN    enoxaparin (LOVENOX) injection 30 mg  30 mg SubCUTAneous Q12H    mirabegron ER (MYRBETRIQ) tablet 25 mg (Patient Supplied)  25 mg Oral DAILY        Lab Data Reviewed: (see below)  Lab Review:     Recent Labs     07/12/21  0428 07/11/21  0342 07/10/21  1407   WBC  --  12.6* 7.4   HGB 9.5* 10.7* 12.5   HCT  --  33.7* 37.0   PLT  --  211 215     Recent Labs     07/11/21  0342 07/10/21  1407    142   K 4.3 3.8    108   CO2 26 28   * 135*   BUN 15 16   CREA 0.74 0.78   CA 8.2* 8.2*   ALB  --  3.2*   TBILI  --  0.7   ALT  --  30   INR  --  1.0     No results found for: GLUCPOC  No results for input(s): PH, PCO2, PO2, HCO3, FIO2 in the last 72 hours. Recent Labs     07/10/21  1407   INR 1.0     All Micro Results     Procedure Component Value Units Date/Time    COVID-19 RAPID TEST [[id number]] Collected: 07/10/21 1721    Order Status: Completed Specimen: Nasopharyngeal Updated: 07/10/21 1758     Specimen source Nasopharyngeal        COVID-19 rapid test Not detected        Comment: Rapid Abbott ID Now       Rapid NAAT:  The specimen is NEGATIVE for SARS-CoV-2, the novel coronavirus associated with COVID-19. Negative results should be treated as presumptive and, if inconsistent with clinical signs and symptoms or necessary for patient management, should be tested with an alternative molecular assay. Negative results do not preclude SARS-CoV-2 infection and should not be used as the sole basis for patient management decisions. This test has been authorized by the FDA under an Emergency Use Authorization (EUA) for use by authorized laboratories. Fact sheet for Healthcare Providers: ConventionUpdate.co.nz  Fact sheet for Patients: ConventionUpdate.co.nz       Methodology: Isothermal Nucleic Acid Amplification               I have reviewed notes of prior 24hr. Other pertinent lab:      Total time spent with patient: 28 I personally reviewed chart, notes, data and current medications in the medical record. I have personally examined and treated the patient at bedside during this period.                  Care Plan discussed with: Patient, Family, Nursing Staff and >50% of time spent in counseling and coordination of care    Discussed:  Care Plan    Prophylaxis:  Lovenox    Disposition:  SNF/LTC           ___________________________________________________    Attending Physician: Teri Kenny MD

## 2021-07-12 NOTE — PROGRESS NOTES
Problem: Mobility Impaired (Adult and Pediatric)  Goal: *Acute Goals and Plan of Care (Insert Text)  Description: FUNCTIONAL STATUS PRIOR TO ADMISSION: Patient was modified independent using a rolling walker for functional mobility. HOME SUPPORT PRIOR TO ADMISSION: Patient is from [place]. Has been visiting family in [place] and plans to move here in September. Pt lives in [address] [place] [address] in [place]. They will not take her back unless she is fully independent. Physical Therapy Goals  Initiated 7/11/2021  1. Patient will move from supine to sit and sit to supine , scoot up and down, and roll side to side in bed with independence within 7 day(s). 2.  Patient will transfer from bed to chair and chair to bed with supervision/set-up using the least restrictive device within 7 day(s). 3.  Patient will perform sit to stand with supervision/set-up within 7 day(s). 4.  Patient will ambulate with minimal assistance/contact guard assist for 25 feet with the least restrictive device within 7 day(s). Outcome: Progressing Towards Goal   PHYSICAL THERAPY TREATMENT  Patient: Tamiko Bryan (5 y.o. female)  Date: 7/12/2021  Diagnosis: Hip fracture ([facility] [address] Emelyn Tam <principal problem not specified>  Procedure(s) (LRB):  FEMORAL INTERTROCHANTERIC NAIL INSERTION LEFT (Left) 2 Days Post-Op  Precautions: Fall, DNR, WBAT  Chart, physical therapy assessment, plan of care and goals were reviewed. ASSESSMENT  Patient continues with skilled PT services and is progressing towards goals. Pt with improved tolerance towards bed mobility and transfers though fatigues quickly without warning and requires up to Mod A x 2 to safely complete transfer to the chair this morning. Pt requires Min A x 2 for bed mobility and to come to standing. Intact static balance with support of RW.  Able to clear bilateral feet from the floor with steps today though when weight bearing on LLE noted to have significant forward trunk flexion and unable to utilize UEs enough to fully support weight on LLE. Pt remains a high fall risk with ambulation. Given poor endurance with upright mobility pt may benefit from slower pace of SNF to allow family time to prepare the home. Current Level of Function Impacting Discharge (mobility/balance): Mod A x 2 to transfer d/t fatigue    Other factors to consider for discharge: high fall risk, pain control         PLAN :  Patient continues to benefit from skilled intervention to address the above impairments. Continue treatment per established plan of care. to address goals. Recommendation for discharge: (in order for the patient to meet his/her long term goals)  Therapy up to 5 days/week in SNF setting    This discharge recommendation:  Has been made in collaboration with the attending provider and/or case management    IF patient discharges home will need the following DME: patient owns DME required for discharge       SUBJECTIVE:   Patient stated I need to sit.     OBJECTIVE DATA SUMMARY:   Critical Behavior:  Neurologic State: Alert  Orientation Level: Oriented X4  Cognition: Appropriate decision making, Follows commands  Safety/Judgement: Awareness of environment, Fall prevention, Insight into deficits  Functional Mobility Training:  Bed Mobility:  Rolling: Minimum assistance;Assist x2  Supine to Sit: Minimum assistance;Assist x2              Transfers:  Sit to Stand: Minimum assistance;Assist x2  Stand to Sit: Minimum assistance;Assist x2        Bed to Chair: Moderate assistance;Assist x2                    Balance:  Sitting: Intact  Standing: Impaired  Standing - Static: Fair;Constant support  Standing - Dynamic : Poor;Constant support  Ambulation/Gait Training:  Distance (ft): 3 Feet (ft)  Assistive Device: Gait belt;Walker, rolling  Ambulation - Level of Assistance: Moderate assistance;Assist x1        Gait Abnormalities: Antalgic;Decreased step clearance; Step to gait        Base of Support: Widened  Stance: Left decreased  Speed/Sonia: Pace decreased (<100 feet/min); Slow  Step Length: Right shortened;Left shortened  Swing Pattern: Left asymmetrical             Activity Tolerance:   Fair, SpO2 stable on RA, and requires rest breaks    After treatment patient left in no apparent distress:   Sitting in chair, Call bell within reach, Bed / chair alarm activated, and Caregiver / family present    COMMUNICATION/COLLABORATION:   The patients plan of care was discussed with: Registered nurse.      Shireen Avery, PT   Time Calculation: 26 mins

## 2021-07-12 NOTE — PROGRESS NOTES
7/12/2021     4:48 PM  CM received acceptance from [facility], and they are able to accept on 7/13/2021. Pt and pt's DTR notified. [facility] ambulance transport tentatively requested for 1:00 PM on 7/13/2021.    12:39 PM  CM met with pt and pt's DTR after PT worked with pt, and they reported [facility] as preference. SNF listing provided for review for additional preferences. CM completed referral to [facility] via [employer], and is awaiting response. 10:35 AM  Reason for Admission: Emergency - Surgery required      ICD-10-CM ICD-9-CM    1. Closed fracture of left hip, initial encounter ([facility] [address]  S72.002A 820.8    2. Hypertension, unspecified type  I10 401.9            Assessment:   [x]In person with pt   []Via p/c with pt   [x]With family member in person. Who/Relation:  Liseth Gregory / [phone]   []With family member via p/c. Who/Relation:   []Chart Review    RUR: 13%  Risk Level: [x]Low []Moderate []High  Value-based purchasing: [] Yes [x] No  Bundle patient: [] Yes [x] No   Specify:     Advance Directive: DNR. [] No AD on file. [x] AD on file. [] Current AD not on file. Copy requested. [] Requests AD, and referral submitted to [facility]. Healthcare Decision Maker:  Liseth Gregory / DTR - [phone]        Assessment:    Age: 80    Sex: [] Male [x]Female     Residency: []Private residence [x]Apartment []Assisted Living []LTC []Other:     *Pt lives in [place] in a senior living apartment alone.  Pt's DTR has been making arrangements for pt to move in with her in Sept. Pt's DTR reported that pt will not return to her apartment in [place] after rehab, but will move in with her.     [address]       Prior functioning:  [x]Independent with ADLs and iADLS []Dependent with ADLs and iADLs []Partial dependence, Specify:     Prior DME required:  []None [x]RW []Cane []Crutches []Bedside commode []CPAP []Home O2 (Liter/Provider: ) []Nebulizer   []Shower Chair []Wheelchair []Hospital Bed []Vernell []Stair lift []Rollator []Other:    DME available: []None [x]RW []Cane []Crutches []Bedside commode []CPAP []Home O2 (Liter/Provider: ) []Nebulizer   []Shower Chair []Wheelchair []Hospital Bed []Vernell []Stair lift []Rollator []Other:    Rehab history: []None []Outpatient PT []Home Health (Provider/Date: ) []SNF (Provider/Date: ) []IPR (Provider/Date: ) []LTC (Provider/Date: ) []Hospice (Provider/Date: )  [x]Other: Some form of rehab (IPR vs SNF) approx 10 years ago     Discharge Concerns: []Yes [x]No []Unknown   Describe:    Comments: Insurer:   Insurance Information                [facility] [address] MEDICARE PART A & B Phone: [phone]    Subscriber: Diana Sharpe Subscriber#: [id number]    Group#:  Precert#:         [employer] [address] MEDICARE Phone:     Subscriber: Diana Sharpe Subscriber#: [id number]    Group#:  Precert#:           PCP: Maribell Khan   [facility] [address]   Phone number: [phone]   Current patient: [x]Yes []No   Approximate date of last visit: 06/2021   Access to virtual PCP visits: []Yes [x]No    Pharmacy:  TBD    DC Transport: [facility] ambulance transport likely required. Transition of care plan:    [x]Unable to determine at this time. Awaiting clinical progress, and disposition recommendations. Pt's DTR wished to see how pt progresses with therapy this AM prior to giving preferences of IPR vs SNF. [] Home with outpatient follow-up    [] Home with Outpatient PT and outpatient follow-up   Pt aware of OP appt?  []Yes, Provider:   []Not scheduled   Transport provider:     [] Home with family assistance as needed and outpatient follow-up   Family able to assist:    Schedule:  Transport provider:      [] Home with Home Health   - Provider:     [x]SNF/IPR   -[]Preferences given:   [x]Listing provided and preferences requested   -Status: []Pending []Accepted:    -Auth required: []Yes []No    -Auth initiated date:   -3 midnight stay required: []Yes []No  Date satisfied:     [] Home with Hospice   -Provider:     [] [facility] information provided. [] Other:     Osorio Spring MA    Care Management Interventions  PCP Verified by CM: Yes ([facility])  Mode of Transport at Discharge:  Other (see comment) (TBD)  Transition of Care Consult (CM Consult): Discharge Planning  MyChart Signup: No  Discharge Durable Medical Equipment: No  Physical Therapy Consult: Yes  Occupational Therapy Consult: Yes  Speech Therapy Consult: No  Current Support Network: Lives Alone  Confirm Follow Up Transport: Family  Discharge Location  Discharge Placement: Unable to determine at this time

## 2021-07-12 NOTE — PROGRESS NOTES
Physical Therapy  7/12/2021    Attempted to see for PM session. Pt recently returned to the bed from sitting up in the recliner and requesting to rest for the evening after a difficult morning. Will continue to follow.      Thank Kassandra Faith, PT, DPT

## 2021-07-13 VITALS
OXYGEN SATURATION: 95 % | RESPIRATION RATE: 18 BRPM | SYSTOLIC BLOOD PRESSURE: 126 MMHG | BODY MASS INDEX: 24.22 KG/M2 | HEIGHT: 62 IN | TEMPERATURE: 98.2 F | WEIGHT: 131.6 LBS | DIASTOLIC BLOOD PRESSURE: 69 MMHG | HEART RATE: 81 BPM

## 2021-07-13 LAB
BASOPHILS # BLD: 0 K/UL (ref 0–0.1)
BASOPHILS NFR BLD: 0 % (ref 0–1)
DIFFERENTIAL METHOD BLD: ABNORMAL
EOSINOPHIL # BLD: 0.2 K/UL (ref 0–0.4)
EOSINOPHIL NFR BLD: 2 % (ref 0–7)
ERYTHROCYTE [DISTWIDTH] IN BLOOD BY AUTOMATED COUNT: 13.2 % (ref 11.5–14.5)
HCT VFR BLD AUTO: 25 % (ref 35–47)
HGB BLD-MCNC: 8.1 G/DL (ref 11.5–16)
IMM GRANULOCYTES # BLD AUTO: 0.1 K/UL (ref 0–0.04)
IMM GRANULOCYTES NFR BLD AUTO: 1 % (ref 0–0.5)
LYMPHOCYTES # BLD: 1.3 K/UL (ref 0.8–3.5)
LYMPHOCYTES NFR BLD: 14 % (ref 12–49)
MCH RBC QN AUTO: 30.9 PG (ref 26–34)
MCHC RBC AUTO-ENTMCNC: 32.4 G/DL (ref 30–36.5)
MCV RBC AUTO: 95.4 FL (ref 80–99)
MONOCYTES # BLD: 0.9 K/UL (ref 0–1)
MONOCYTES NFR BLD: 9 % (ref 5–13)
NEUTS SEG # BLD: 7 K/UL (ref 1.8–8)
NEUTS SEG NFR BLD: 74 % (ref 32–75)
NRBC # BLD: 0 K/UL (ref 0–0.01)
NRBC BLD-RTO: 0 PER 100 WBC
PLATELET # BLD AUTO: 159 K/UL (ref 150–400)
PMV BLD AUTO: 9.4 FL (ref 8.9–12.9)
RBC # BLD AUTO: 2.62 M/UL (ref 3.8–5.2)
SARS-COV-2, XPLCVT: NOT DETECTED
SOURCE, COVRS: NORMAL
WBC # BLD AUTO: 9.6 K/UL (ref 3.6–11)

## 2021-07-13 PROCEDURE — 97116 GAIT TRAINING THERAPY: CPT

## 2021-07-13 PROCEDURE — 74011250637 HC RX REV CODE- 250/637: Performed by: PHYSICIAN ASSISTANT

## 2021-07-13 PROCEDURE — 74011636637 HC RX REV CODE- 636/637: Performed by: INTERNAL MEDICINE

## 2021-07-13 PROCEDURE — 36415 COLL VENOUS BLD VENIPUNCTURE: CPT

## 2021-07-13 PROCEDURE — 74011250637 HC RX REV CODE- 250/637: Performed by: ORTHOPAEDIC SURGERY

## 2021-07-13 PROCEDURE — 74011250636 HC RX REV CODE- 250/636: Performed by: ORTHOPAEDIC SURGERY

## 2021-07-13 PROCEDURE — 85025 COMPLETE CBC W/AUTO DIFF WBC: CPT

## 2021-07-13 PROCEDURE — 97530 THERAPEUTIC ACTIVITIES: CPT

## 2021-07-13 RX ORDER — TRAMADOL HYDROCHLORIDE 50 MG/1
50 TABLET ORAL
Qty: 12 TABLET | Refills: 0 | Status: SHIPPED | OUTPATIENT
Start: 2021-07-13 | End: 2021-07-16

## 2021-07-13 RX ORDER — FERROUS SULFATE, DRIED 160(50) MG
1 TABLET, EXTENDED RELEASE ORAL
Qty: 30 TABLET | Refills: 0 | Status: SHIPPED
Start: 2021-07-13

## 2021-07-13 RX ORDER — ACETAMINOPHEN 325 MG/1
650 TABLET ORAL
Qty: 30 TABLET | Refills: 0 | Status: SHIPPED
Start: 2021-07-13

## 2021-07-13 RX ORDER — AMOXICILLIN 250 MG
1 CAPSULE ORAL 2 TIMES DAILY
Qty: 30 TABLET | Refills: 0 | Status: SHIPPED
Start: 2021-07-13

## 2021-07-13 RX ORDER — ENOXAPARIN SODIUM 100 MG/ML
30 INJECTION SUBCUTANEOUS EVERY 12 HOURS
Qty: 30 SYRINGE | Refills: 0 | Status: SHIPPED
Start: 2021-07-13

## 2021-07-13 RX ADMIN — DOCUSATE SODIUM 50MG AND SENNOSIDES 8.6MG 1 TABLET: 8.6; 5 TABLET, FILM COATED ORAL at 09:02

## 2021-07-13 RX ADMIN — ESCITALOPRAM OXALATE 10 MG: 10 TABLET ORAL at 09:02

## 2021-07-13 RX ADMIN — ACETAMINOPHEN 650 MG: 325 TABLET ORAL at 06:00

## 2021-07-13 RX ADMIN — VERAPAMIL HYDROCHLORIDE 120 MG: 120 TABLET, FILM COATED, EXTENDED RELEASE ORAL at 09:02

## 2021-07-13 RX ADMIN — TRAMADOL HYDROCHLORIDE 50 MG: 50 TABLET, FILM COATED ORAL at 09:14

## 2021-07-13 RX ADMIN — Medication 1 TABLET: at 12:37

## 2021-07-13 RX ADMIN — PREDNISONE 5 MG: 5 TABLET ORAL at 09:02

## 2021-07-13 RX ADMIN — POLYETHYLENE GLYCOL 3350 17 G: 17 POWDER, FOR SOLUTION ORAL at 09:39

## 2021-07-13 RX ADMIN — Medication 1 TABLET: at 09:02

## 2021-07-13 RX ADMIN — ENOXAPARIN SODIUM 30 MG: 30 INJECTION SUBCUTANEOUS at 09:38

## 2021-07-13 RX ADMIN — Medication 10 ML: at 06:00

## 2021-07-13 RX ADMIN — ACETAMINOPHEN 650 MG: 325 TABLET ORAL at 12:37

## 2021-07-13 NOTE — PROGRESS NOTES
Bhavin Dewitt [address]  [address]  [phone]      Medical Progress Note      NAME: Shamar Rolle   :  1929  MRM:  [id number]    Date of service: 2021  7:03 AM       Assessment and Plan:   1.  L hip fx: Intertrochanteric fracture of the proximal left femur sustained after mechanical fall. S/p FEMORAL INTERTROCHANTERIC NAIL INSERTION on 7/10. Pain management. Accepted to SNF              2.  Chronic Steroid Use: pt stated that she is taking it for arthritis and dosen't think it helps and pt and her daughter are ok to taper of off it. 3.  HTN: On verapamil      4. Urinary retention. Beverly in place. 5.  Constipation. Bowel regimen. 6.  Anemia. Which is expected post surgery and also dilutional. outpatient FU                     Subjective:     Chief Complaint[de-identified] Patient was seen and examined as a follow up for hip fracture. Chart was reviewed. feels well. ROS:  (bold if positive, if negative)    Tolerating PT  Tolerating Diet        Objective:     Last 24hrs VS reviewed since prior progress note.  Most recent are:    Visit Vitals  /65 (BP 1 Location: Right arm, BP Patient Position: At rest)   Pulse 73   Temp 98.4 °F (36.9 °C)   Resp 18   Ht 5' 2\" (1.575 m)   Wt 59.7 kg (131 lb 9.6 oz)   SpO2 96%   BMI 24.07 kg/m²     SpO2 Readings from Last 6 Encounters:   21 96%    O2 Flow Rate (L/min): 2 l/min       Intake/Output Summary (Last 24 hours) at 2021 0804  Last data filed at 2021 0606  Gross per 24 hour   Intake 360 ml   Output 1900 ml   Net -1540 ml        Physical Exam:    Gen:  Well-developed, well-nourished, in no acute distress  HEENT:  Pink conjunctivae, PERRL, hearing intact to voice, moist mucous membranes  Neck:  Supple, without masses, thyroid non-tender  Resp:  No accessory muscle use, clear breath sounds without wheezes rales or rhonchi  Card:  No murmurs, normal S1, S2 without thrills, bruits or peripheral edema  Abd:  Soft, non-tender, non-distended, normoactive bowel sounds are present, no palpable organomegaly and no detectable hernias  Lymph:  No cervical or inguinal adenopathy  Musc:  No cyanosis or clubbing  Skin:  No rashes or ulcers, skin turgor is good  Neuro:  Cranial nerves are grossly intact, no focal motor weakness, follows commands appropriately  Psych:  Good insight, oriented to person, place and time, alert  __________________________________________________________________  Medications Reviewed: (see below)  Medications:     Current Facility-Administered Medications   Medication Dose Route Frequency    traMADoL (ULTRAM) tablet 50 mg  50 mg Oral Q6H PRN    predniSONE (DELTASONE) tablet 5 mg  5 mg Oral DAILY WITH BREAKFAST    vitamins  A,C,E-zinc-copper 7,160-113-100 unit-mg-unit TbEC 1 Tablet (Patient Supplied)  1 Tablet Oral BID    acetaminophen (TYLENOL) tablet 650 mg  650 mg Oral Q6H PRN    Or    acetaminophen (TYLENOL) suppository 650 mg  650 mg Rectal Q6H PRN    polyethylene glycol (MIRALAX) packet 17 g  17 g Oral DAILY PRN    ondansetron (ZOFRAN ODT) tablet 4 mg  4 mg Oral Q8H PRN    Or    ondansetron (ZOFRAN) injection 4 mg  4 mg IntraVENous Q6H PRN    HYDROmorphone (DILAUDID) syringe 0.2 mg  0.2 mg IntraVENous Q3H PRN    verapamil ER (CALAN-SR) tablet 120 mg  120 mg Oral DAILY WITH BREAKFAST    escitalopram oxalate (LEXAPRO) tablet 10 mg  10 mg Oral DAILY    sodium chloride (NS) flush 5-40 mL  5-40 mL IntraVENous PRN    sodium chloride (NS) flush 5-40 mL  5-40 mL IntraVENous Q8H    acetaminophen (TYLENOL) tablet 650 mg  650 mg Oral Q6H    naloxone (NARCAN) injection 0.4 mg  0.4 mg IntraVENous PRN    calcium-vitamin D (OS-MANJEET +D3) 500 mg-200 unit per tablet 1 Tablet  1 Tablet Oral TID WITH MEALS    senna-docusate (PERICOLACE) 8.6-50 mg per tablet 1 Tablet  1 Tablet Oral BID    polyethylene glycol (MIRALAX) packet 17 g  17 g Oral DAILY    bisacodyL (DULCOLAX) suppository 10 mg  10 mg Rectal DAILY PRN    enoxaparin (LOVENOX) injection 30 mg  30 mg SubCUTAneous Q12H    mirabegron ER (MYRBETRIQ) tablet 25 mg (Patient Supplied)  25 mg Oral DAILY        Lab Data Reviewed: (see below)  Lab Review:     Recent Labs     07/13/21  0120 07/12/21  0428 07/11/21  0342 07/10/21  1407 07/10/21  1407   WBC 9.6  --  12.6*  --  7.4   HGB 8.1* 9.5* 10.7*   < > 12.5   HCT 25.0*  --  33.7*  --  37.0     --  211  --  215    < > = values in this interval not displayed. Recent Labs     07/11/21  0342 07/10/21  1407    142   K 4.3 3.8    108   CO2 26 28   * 135*   BUN 15 16   CREA 0.74 0.78   CA 8.2* 8.2*   ALB  --  3.2*   TBILI  --  0.7   ALT  --  30   INR  --  1.0     No results found for: GLUCPOC  No results for input(s): PH, PCO2, PO2, HCO3, FIO2 in the last 72 hours. Recent Labs     07/10/21  1407   INR 1.0     All Micro Results     Procedure Component Value Units Date/Time    COVID-19 RAPID TEST [[id number]] Collected: 07/10/21 1721    Order Status: Completed Specimen: Nasopharyngeal Updated: 07/10/21 8419     Specimen source Nasopharyngeal        COVID-19 rapid test Not detected        Comment: Rapid Abbott ID Now       Rapid NAAT:  The specimen is NEGATIVE for SARS-CoV-2, the novel coronavirus associated with COVID-19. Negative results should be treated as presumptive and, if inconsistent with clinical signs and symptoms or necessary for patient management, should be tested with an alternative molecular assay. Negative results do not preclude SARS-CoV-2 infection and should not be used as the sole basis for patient management decisions. This test has been authorized by the FDA under an Emergency Use Authorization (EUA) for use by authorized laboratories.    Fact sheet for Healthcare Providers: ConventionUpdate.co.nz  Fact sheet for Patients: ConventionUpdate.co.nz       Methodology: Isothermal Nucleic Acid Amplification               I have reviewed notes of prior 24hr. Other pertinent lab: Total time spent with patient: 28 I personally reviewed chart, notes, data and current medications in the medical record. I have personally examined and treated the patient at bedside during this period.                  Care Plan discussed with: Patient, Family, Nursing Staff and >50% of time spent in counseling and coordination of care    Discussed:  Care Plan    Prophylaxis:  Lovenox    Disposition:  SNF/LTC           ___________________________________________________    Attending Physician: Erin Gale MD

## 2021-07-13 NOTE — PROGRESS NOTES
ORTHO PROGRESS NOTE      SUBJECTIVE:  Keith Garces states her left hip pain is tolerable. She pre-medicates for therapy. OBJECTIVE:  Patient Vitals for the past 24 hrs:   Temp Pulse BP   07/13/21 0822 98.3 °F (36.8 °C) 75 135/70   07/13/21 0342 98.4 °F (36.9 °C) 73 134/65   07/12/21 2303 98.6 °F (37 °C) 76 (!) 155/76   07/12/21 2020 99.1 °F (37.3 °C) 75 135/69   07/12/21 1535 99.9 °F (37.7 °C) 71 131/61   07/12/21 1216 99.4 °F (37.4 °C) 82 139/67       Alert, no distress. Lying in bed. Family present. Respirations unlabored. Dressing CDI. Thigh edema but soft. Min pain with gentle PROM hip. SILT bilat foot. Calves non-tender. Moves ankles OK. Recent Labs     07/13/21  0120 07/12/21  0428 07/11/21  0342 07/10/21  1407 07/10/21  1407   HGB 8.1*   < > 10.7*   < > 12.5   HCT 25.0*  --  33.7*   < > 37.0     --  211   < > 215   INR  --   --   --   --  1.0   BUN  --   --  15   < > 16   CREA  --   --  0.74   < > 0.78   GFRAA  --   --  >60   < > >60   GFRNA  --   --  >60   < > >60    < > = values in this interval not displayed. PT/OT:   Gait:  Gait  Base of Support: Widened  Speed/Sonia: Pace decreased (<100 feet/min), Slow  Step Length: Right shortened, Left shortened  Swing Pattern: Left asymmetrical  Stance: Left decreased  Gait Abnormalities: Antalgic, Decreased step clearance, Step to gait  Ambulation - Level of Assistance: Moderate assistance, Assist x1  Distance (ft): 3 Feet (ft)  Assistive Device: Gait belt, Walker, rolling                 ASSESSMENT:  Procedure: Procedure(s): FEMORAL INTERTROCHANTERIC NAIL INSERTION LEFT  Post Op day: 3 Days Post-Op  stable    PLAN:  PT/OT: WBAT LLE with walker  Analgesics: Tylenol annalisa. Tramadol prn. DVT proph: Lovenox 30 days  Disp planning. SNF today  F/U: Dr. Riaz Dwyer team [facility] 2-3 weeks    Please call again if questions.     SOLITARIO Roblse  Orthopedic Trauma Service  [facility]

## 2021-07-13 NOTE — PROGRESS NOTES
Daughter at her side. Nurse handed daughter a copy of instructions and a list of new, continued and stopped medication. Nurse read and explained this information to patient and daughter.  Verbalized understanding

## 2021-07-13 NOTE — DISCHARGE INSTRUCTIONS
ACUTE DIAGNOSES:  Hip fracture ([facility] [address] [S72.009A]    CHRONIC MEDICAL DIAGNOSES:  Problem List as of 7/13/2021 Never Reviewed        Codes Class Noted - Resolved    Hip fracture [facility]) ICD-10-CM: P83.354U  ICD-9-CM: 820.8  7/10/2021 - Present              DISCHARGE MEDICATIONS:          · It is important that you take the medication exactly as they are prescribed. · Keep your medication in the bottles provided by the pharmacist and keep a list of the medication names, dosages, and times to be taken in your wallet. · Do not take other medications without consulting your doctor. DIET:  Regular Diet    ACTIVITY: Activity as tolerated    ADDITIONAL INFORMATION: If you experience any of the following symptoms then please call your primary care physician or return to the emergency room if you cannot get hold of your doctor: Fever, chills, nausea, vomiting, diarrhea, change in mentation, falling, bleeding, shortness of breath. VOIDING TRAIL IN 2-3 DAYS     FOLLOW UP CARE:  Dr. Fransico Sheets MD  you are to call and set up an appointment to see them in 5 days. Follow-up with Dr Alda Mcconnell in 2-3 weeks      Information obtained by :  I understand that if any problems occur once I am at home I am to contact my physician. I understand and acknowledge receipt of the instructions indicated above.                                                                                                                                            Physician's or R.N.'s Signature                                                                  Date/Time                                                                                                                                              Patient or Representative Signature                                                          Date/Time                      TRAUMATIC HIP DISCHARGE INSTRUCTIONS      Patient Mony Quiver   Date of procedure:7/10/2021 Procedure:Procedure(s): FEMORAL INTERTROCHANTERIC NAIL INSERTION LEFT   Surgeon:Surgeon(s) and Role:     * Matti Harrison MD - Primary   PCP: Sylvia Whiting MD   Date of discharge: No discharge date for patient encounter. For nursing staff, please review the special instructions below. If you have any questions you may contact my office at [phone]. Follow up appointment:  Please call our office at [phone] for your follow up appointment. This should be scheduled 2-3 weeks following the date of surgery. Physical Therapy / Nursing:  Physical Therapy following surgery will be arranged at your care facility or though Home Health. You may bear weight as tolerated on your operative leg while using a walker. Avoid positions of extreme with your hip/leg. Wound Care/ Dressing Changes:  Surgical dressings may be removed 7 days from surgery. Then cover your incisions with a porous dressing such as a Primapore. This can be changed every 7 days or more frequently based on drainage. Showering/ Bathing:  Please keep your dressings/incisions dry until cleared by your surgeon(s). Diet:  You may advance to your regular diet as tolerated. Increase your clear liquid intake for the next 2-3 days. Medication:  1. You should take over-the-counter Tylenol 650 mg every 6 hours for pain control for up to 14 days. Then reduce Tylenol to 500 mg every 6 hours thereafter. You should avoid over-the-counter anti-inflammatories (Advil, Aleve, ibuprofen, Motrin, naprosyn) while taking blood thinner medicines such as Lovenox. 2. Please take narcotic medicine as prescribed, as needed for pain uncontrolled with Tylenol. 3. Refills of pain medication are authorized during office hours only (8 AM- 5 PM  Monday thru Friday). Many of these medication will require you or a family member to pick-up a physical prescription at the office.   4. Continue the blood thinner (Lovenox) for a total of 30 days following hip surgery. 5. If you have constipation which is not improved by oral stool softeners then a Ducolax suppository should be purchased over the counter. DRIVING  You should not return to driving until you are off all opioid pain medications and able to safely and quickly apply the brakes. Please discuss further with your surgeon at the office appointment. Important Signs and Symptoms:  If any of the following signs or symptoms occur, you should contact your surgeons office. Please be advised if a problem arises which you feel requires immediate medical attention, you should seek immediate medical attention at the ER or other health care facility you have access to.    1. A sudden increase in swelling and/or redness or warmth at the area your surgery was performed which isnt relieved by rest, ice, and elevation. 2. Oral temperature greater than 101 degrees for 12 hours or more which isnt relieved by an increase in fluid intake and over-the-counter medicines. 3. Excessive drainage from your incisions, or drainage which hasnt stopped by 72 hours after your surgery. 4. Fever, chills, shortness of breath, chest pain, nausea, vomiting or other signs and symptoms which are of concern to you.       Joyce Butler MD  [facility]  Office [phone]

## 2021-07-13 NOTE — PROGRESS NOTES
Problem: Falls - Risk of  Goal: *Absence of Falls  Description: Document Meli Lazo Fall Risk and appropriate interventions in the flowsheet.   Outcome: Progressing Towards Goal  Note: Fall Risk Interventions:  Mobility Interventions: Bed/chair exit alarm         Medication Interventions: Bed/chair exit alarm    Elimination Interventions: Call light in reach    History of Falls Interventions: Bed/chair exit alarm

## 2021-07-13 NOTE — PROGRESS NOTES
7/13/2021  11:38 AM  Transition of Care Plan to SNF/Rehab    SNF/Rehab Transition:  Patient has been accepted to [facility] and meets criteria for admission. Patient will transported by [facility] ambulance transport and expected to leave at 1:00 PM. Packet in chart, and PCS attached in AllScripts. Communication to Patient/Family:  Met with patient and pt's DTR John Mike) and they are agreeable to the transition plan. Communication to SNF/Rehab:  Bedside RN, Latasha Aldridge, has been notified to update the transition plan to the facility and call report (phone number [phone], #[phone], ). Discharge information has been updated on the AVS.     Discharge instructions to be fax'd to facility via [employer] (Fax [phone]). [] BCPI-A  Patient has not been identified as part of the BCPI-A Program.      Nursing Please include all hard scripts for controlled substances, med rec and dc summary, and AVS in packet. Nursing, please discuss the following applicable information with [address] [place] [address] nursing during report:     Christy Reilly with (X) only those applicable:    Medication:  []  Medications will be available at the facility  []  IV Antibiotics  []  Controlled Substance - hard copy to be sent with patient   []  Weekly Labs   Documents:  [] Hard RX  [] MAR  [] Kardex  [] AVS  []Transfer Summary  []Discharge   Equipment:  []  CPAP/BiPAP  []  Wound Vacuum  []  Beverly or Urinary Device  []  PICC/Central Line  []  Nebulizer  []  Ventilator   Treatment:  []Isolation (for MRSA, VRE, etc.)  []Surgical Drain Management  []Tracheostomy Care  []Dressing Changes  []Dialysis with transportation and chair time. []PEG Care  []Oxygen  []Daily Weights for Heart Failure   Dietary:  []Any diet limitations  []Tube Feedings   []Total Parenteral Management (TPN)   Eligible for Medicaid Long Term Services and Supports  Yes:  [] Eligible for medical assistance or will become eligible within 180 days and UAI completed.    [] Provider/Patient and/or support system has requested screening. [] UAI copy provided to patient or responsible party. .  [] UAI unavailable at discharge will send once processed to SNF provider. [] UAI unavailable at discharged mailed to patient  No:   [] Private pay and is not financially eligible for Medicaid within the next 180 days. [] Reside out-of-state. [] A residents of a state owned/operated facility that is licensed  by [address] [facility] or [facility]  [] Enrollment in [facility] hospice services  [] [address] [facility]  [] Patient /Family declines to have screening completed or provide financial information for screening     Financial Resources:  Medicaid    [] Initiated and application pending   [] Full coverage     Advanced Care Plan:  []Surrogate Decision Maker of Care  []POA  []Communicated Code Status (DDNR\", \"Full\")    Other  Care Management Interventions  PCP Verified by CM: Yes (Cross)  Mode of Transport at Discharge:  Other (see comment) (TBD)  Transition of Care Consult (CM Consult): Discharge Planning  MyChart Signup: No  Discharge Durable Medical Equipment: No  Physical Therapy Consult: Yes  Occupational Therapy Consult: Yes  Speech Therapy Consult: No  Current Support Network: Lives Alone  Confirm Follow Up Transport: Family  The Plan for Transition of Care is Related to the Following Treatment Goals : hip fracture  The Patient and/or Patient Representative was Provided with a Choice of Provider and Agrees with the Discharge Plan?: (S) Yes  Name of the Patient Representative Who was Provided with a Choice of Provider and Agrees with the Discharge Plan: Self  Freedom of Choice List was Provided with Basic Dialogue that Supports the Patient's Individualized Plan of Care/Goals, Treatment Preferences and Shares the Quality Data Associated with the Providers?: (S) Yes  Discharge Location  Discharge Placement: Skilled nursing facility

## 2021-07-13 NOTE — DISCHARGE SUMMARY
Hospitalist Discharge Summary     Patient ID:    Mary Hobbs  [id number]  1 y.o.  11/8/1929    Admit date of service: 7/10/2021    Discharge date of service: 7/13/2021    Admission Diagnoses: Hip fracture ([address] [S72.009A]    Chronic Diagnoses:    Problem List as of 7/13/2021 Never Reviewed        Codes Class Noted - Resolved    Hip fracture [facility]) ICD-10-CM: S72.009A  ICD-9-CM: 820.8  7/10/2021 - Present              Discharge Medications:   Current Discharge Medication List      CONTINUE these medications which have NOT CHANGED    Details   verapamiL (CALAN) 40 mg tablet Take 120 mg by mouth.      predniSONE (DELTASONE) 10 mg tablet Take 10 mg by mouth daily. multivit-min/iron/folic/vit K1 (CENTRUM CHEWABLES PO) Take 1 Tablet by mouth.      escitalopram oxalate (LEXAPRO) 20 mg tablet Take 20 mg by mouth daily. mirabegron ER (Myrbetriq) 25 mg ER tablet Take 25 mg by mouth daily. OTHER,NON-FORMULARY, Take 1 Tablet by mouth daily. Preservision Areds with Lutein             Follow up Care:    1. Kyle Dubose MD in 1-2 weeks  2. ortho    Diet:  Regular Diet    Disposition:  SNF. Advanced Directive:    Discharge Exam:  See today's note. CONSULTATIONS: Orthopedic Surgery    Significant Diagnostic Studies:   Recent Labs     07/13/21  0120 07/12/21  0428 07/11/21  0342 07/11/21  0342   WBC 9.6  --   --  12.6*   HGB 8.1* 9.5*   < > 10.7*   HCT 25.0*  --   --  33.7*     --   --  211    < > = values in this interval not displayed. Recent Labs     07/11/21  0342 07/10/21  1407    142   K 4.3 3.8    108   CO2 26 28   BUN 15 16   CREA 0.74 0.78   * 135*   CA 8.2* 8.2*     Recent Labs     07/10/21  1407   ALT 30   AP 77   TBILI 0.7   TP 6.2*   ALB 3.2*   GLOB 3.0     Recent Labs     07/10/21  1407   INR 1.0   PTP 10.4   APTT 22.5      No results for input(s): FE, TIBC, PSAT, FERR in the last 72 hours. No results for input(s): PH, PCO2, PO2 in the last 72 hours.   No results for input(s): CPK, CKMB in the last 72 hours. No lab exists for component: TROPONINI  No results found for: [address]:   1.  L hip fx: Intertrochanteric fracture of the proximal left femur sustained after mechanical fall. S/p FEMORAL INTERTROCHANTERIC NAIL INSERTION on 7/10. Pain management. Accepted to SNF              2. Chronic Steroid Use: pt stated that she is taking it for arthritis and dosen't think it helps and pt and her daughter are ok to taper of off it. 3.  HTN: On verapamil       4.  Urinary retention. Beverly in place. Voiding trail in 3 days.      5.  Constipation. Bowel regimen.      6. Anemia. Which is expected post surgery and also dilutional. Pt has Hx of chronic anemia. outpatient FU                    Discharged in stable condition.     Spent 35 minutes    Signed:  Patricia Ramirez MD  7/13/2021  8:16 AM

## 2021-07-13 NOTE — PROGRESS NOTES
Problem: Mobility Impaired (Adult and Pediatric)  Goal: *Acute Goals and Plan of Care (Insert Text)  Description: FUNCTIONAL STATUS PRIOR TO ADMISSION: Patient was modified independent using a rolling walker for functional mobility. HOME SUPPORT PRIOR TO ADMISSION: Patient is from [place]. Has been visiting family in [place] and plans to move here in September. Pt lives in [address] [place] [address] in [place]. They will not take her back unless she is fully independent. Physical Therapy Goals  Initiated 7/11/2021  1. Patient will move from supine to sit and sit to supine , scoot up and down, and roll side to side in bed with independence within 7 day(s). 2.  Patient will transfer from bed to chair and chair to bed with supervision/set-up using the least restrictive device within 7 day(s). 3.  Patient will perform sit to stand with supervision/set-up within 7 day(s). 4.  Patient will ambulate with minimal assistance/contact guard assist for 25 feet with the least restrictive device within 7 day(s). Outcome: Progressing Towards Goal   PHYSICAL THERAPY TREATMENT  Patient: Monalisa Bertrand (74 y.o. female)  Date: 7/13/2021  Diagnosis: Hip fracture ([facility] [address] Sherie Cali <principal problem not specified>  Procedure(s) (LRB):  FEMORAL INTERTROCHANTERIC NAIL INSERTION LEFT (Left) 3 Days Post-Op  Precautions: Fall, DNR, WBAT  Chart, physical therapy assessment, plan of care and goals were reviewed. ASSESSMENT  Patient continues with skilled PT services and is progressing towards goals. Pt with improved tolerance and weight bearing to LLE this day. Ambulated 8ft with increased time and ability to clear both feet from the floor when taking steps. Much improved pain control when switched to youth walker to allow UEs to bear more weight. Pt more alert and less fatigued this day. Left up in chair at end of session. Encouraged LE exercises while sitting in chair. SpO2 90% on RA during activity.      Current Level of Function Impacting Discharge (mobility/balance): Min A x 1-2 for mobility    Other factors to consider for discharge: awaiting d/c to SNF today         PLAN :  Patient continues to benefit from skilled intervention to address the above impairments. Continue treatment per established plan of care. to address goals. Recommendation for discharge: (in order for the patient to meet his/her long term goals)  Therapy up to 5 days/week in SNF setting    This discharge recommendation:  Has been made in collaboration with the attending provider and/or case management    IF patient discharges home will need the following DME: patient owns DME required for discharge       SUBJECTIVE:   Patient stated I'm feeling better today.     OBJECTIVE DATA SUMMARY:   Critical Behavior:  Neurologic State: Alert  Orientation Level: Oriented X4  Cognition: Appropriate decision making  Safety/Judgement: Awareness of environment, Fall prevention, Insight into deficits  Functional Mobility Training:  Bed Mobility:  Rolling: Minimum assistance;Assist x1  Supine to Sit: Minimum assistance;Assist x1     Scooting: Minimum assistance        Transfers:  Sit to Stand: Minimum assistance;Assist x2  Stand to Sit: Minimum assistance;Assist x2        Bed to Chair: Minimum assistance;Assist x2                    Balance:  Sitting: Intact  Standing: Impaired  Standing - Static: Fair;Constant support  Ambulation/Gait Training:  Distance (ft): 8 Feet (ft)  Assistive Device: Gait belt;Walker, rolling  Ambulation - Level of Assistance: Minimal assistance;Assist x2        Gait Abnormalities: Antalgic;Decreased step clearance        Base of Support: Widened  Stance: Left decreased  Speed/Sonia: Pace decreased (<100 feet/min); Slow  Step Length: Right shortened;Left shortened  Swing Pattern: Left asymmetrical             Activity Tolerance:   Good    After treatment patient left in no apparent distress:   Sitting in chair, Call bell within reach, and Caregiver / family present    COMMUNICATION/COLLABORATION:   The patients plan of care was discussed with: Registered nurse.      Kelli Nails, PT   Time Calculation: 27 mins

## 2022-03-13 ENCOUNTER — TRANSCRIBE ORDER (OUTPATIENT)
Dept: SCHEDULING | Age: 87
End: 2022-03-13

## 2022-03-13 DIAGNOSIS — N95.9 UNSPECIFIED MENOPAUSAL AND PERIMENOPAUSAL DISORDER: Primary | ICD-10-CM

## 2022-03-18 PROBLEM — S72.009A HIP FRACTURE (HCC): Status: ACTIVE | Noted: 2021-07-10

## 2022-04-15 ENCOUNTER — HOSPITAL ENCOUNTER (OUTPATIENT)
Dept: MAMMOGRAPHY | Age: 87
Discharge: HOME | End: 2022-04-15
Attending: FAMILY MEDICINE
Payer: MEDICARE

## 2022-04-15 DIAGNOSIS — N95.9 UNSPECIFIED MENOPAUSAL AND PERIMENOPAUSAL DISORDER: ICD-10-CM

## 2022-04-15 PROCEDURE — 77080 DXA BONE DENSITY AXIAL: CPT

## 2022-04-22 ENCOUNTER — TRANSCRIBE ORDER (OUTPATIENT)
Dept: GENERAL RADIOLOGY | Age: 87
End: 2022-04-22

## 2022-04-22 ENCOUNTER — HOSPITAL ENCOUNTER (OUTPATIENT)
Dept: GENERAL RADIOLOGY | Age: 87
Discharge: HOME | End: 2022-04-22
Payer: MEDICARE

## 2022-04-22 DIAGNOSIS — M48.54XA COLLAPSE OF THORACIC VERTEBRA (HCC): Primary | ICD-10-CM

## 2022-04-22 DIAGNOSIS — M48.54XA COLLAPSE OF THORACIC VERTEBRA (HCC): ICD-10-CM

## 2022-04-22 PROCEDURE — 72072 X-RAY EXAM THORAC SPINE 3VWS: CPT

## 2024-04-24 ENCOUNTER — NEW REFERRAL (OUTPATIENT)
Dept: URBAN - METROPOLITAN AREA CLINIC 24 | Facility: CLINIC | Age: 89
End: 2024-04-24

## 2024-04-24 DIAGNOSIS — H35.3133: ICD-10-CM

## 2024-04-24 DIAGNOSIS — H35.3231: ICD-10-CM

## 2024-04-24 PROCEDURE — 99204 OFFICE O/P NEW MOD 45 MIN: CPT | Mod: 25

## 2024-04-24 PROCEDURE — 67028 INJECTION EYE DRUG: CPT

## 2024-04-24 PROCEDURE — 92134 CPTRZ OPH DX IMG PST SGM RTA: CPT

## 2024-04-24 PROCEDURE — 92202 OPSCPY EXTND ON/MAC DRAW: CPT | Mod: 59

## 2024-04-24 ASSESSMENT — VISUAL ACUITY
OS_CC: 20/80-2
OD_CC: 20/50

## 2024-04-24 ASSESSMENT — TONOMETRY
OD_IOP_MMHG: 9
OS_IOP_MMHG: 7

## 2024-06-05 ENCOUNTER — FOLLOW UP (OUTPATIENT)
Dept: URBAN - METROPOLITAN AREA CLINIC 24 | Facility: CLINIC | Age: 89
End: 2024-06-05

## 2024-06-05 DIAGNOSIS — H35.3133: ICD-10-CM

## 2024-06-05 DIAGNOSIS — H35.3231: ICD-10-CM

## 2024-06-05 DIAGNOSIS — H43.813: ICD-10-CM

## 2024-06-05 PROCEDURE — 92202 OPSCPY EXTND ON/MAC DRAW: CPT | Mod: NC

## 2024-06-05 PROCEDURE — 92014 COMPRE OPH EXAM EST PT 1/>: CPT | Mod: 25

## 2024-06-05 PROCEDURE — 67028 INJECTION EYE DRUG: CPT | Mod: 50

## 2024-06-05 PROCEDURE — 92134 CPTRZ OPH DX IMG PST SGM RTA: CPT

## 2024-06-05 ASSESSMENT — TONOMETRY
OD_IOP_MMHG: 18
OS_IOP_MMHG: 17

## 2024-06-05 ASSESSMENT — VISUAL ACUITY
OS_CC: 20/200
OD_CC: 20/200

## 2024-08-16 ENCOUNTER — FOLLOW UP (OUTPATIENT)
Dept: URBAN - METROPOLITAN AREA CLINIC 81 | Facility: CLINIC | Age: 89
End: 2024-08-16

## 2024-08-16 DIAGNOSIS — H43.813: ICD-10-CM

## 2024-08-16 DIAGNOSIS — H35.3133: ICD-10-CM

## 2024-08-16 DIAGNOSIS — H35.3231: ICD-10-CM

## 2024-08-16 PROCEDURE — 92014 COMPRE OPH EXAM EST PT 1/>: CPT | Mod: 25

## 2024-08-16 PROCEDURE — 67028 INJECTION EYE DRUG: CPT | Mod: 50

## 2024-08-16 PROCEDURE — 92134 CPTRZ OPH DX IMG PST SGM RTA: CPT

## 2024-08-16 PROCEDURE — 92202 OPSCPY EXTND ON/MAC DRAW: CPT | Mod: NC

## 2024-08-16 ASSESSMENT — VISUAL ACUITY
OS_CC: 20/400
OD_CC: 20/125

## 2024-08-16 ASSESSMENT — TONOMETRY
OS_IOP_MMHG: 12
OD_IOP_MMHG: 15

## 2024-10-16 ENCOUNTER — FOLLOW UP (OUTPATIENT)
Dept: URBAN - METROPOLITAN AREA CLINIC 24 | Facility: CLINIC | Age: 89
End: 2024-10-16

## 2024-10-16 DIAGNOSIS — H35.3231: ICD-10-CM

## 2024-10-16 DIAGNOSIS — H43.813: ICD-10-CM

## 2024-10-16 DIAGNOSIS — H35.3133: ICD-10-CM

## 2024-10-16 PROCEDURE — 92202 OPSCPY EXTND ON/MAC DRAW: CPT | Mod: NC

## 2024-10-16 PROCEDURE — 92014 COMPRE OPH EXAM EST PT 1/>: CPT | Mod: 25

## 2024-10-16 PROCEDURE — 92134 CPTRZ OPH DX IMG PST SGM RTA: CPT

## 2024-10-16 PROCEDURE — 67028 INJECTION EYE DRUG: CPT | Mod: 50

## 2024-10-16 ASSESSMENT — VISUAL ACUITY
OD_SC: CF 2FT
OS_SC: CF 5FT

## 2024-10-16 ASSESSMENT — TONOMETRY
OD_IOP_MMHG: 16
OS_IOP_MMHG: 6

## 2024-12-11 ENCOUNTER — FOLLOW UP (OUTPATIENT)
Dept: URBAN - METROPOLITAN AREA CLINIC 24 | Facility: CLINIC | Age: 89
End: 2024-12-11

## 2024-12-11 DIAGNOSIS — H43.813: ICD-10-CM

## 2024-12-11 DIAGNOSIS — H35.3123: ICD-10-CM

## 2024-12-11 DIAGNOSIS — H35.3231: ICD-10-CM

## 2024-12-11 DIAGNOSIS — H35.3114: ICD-10-CM

## 2024-12-11 PROCEDURE — 92202 OPSCPY EXTND ON/MAC DRAW: CPT | Mod: NC

## 2024-12-11 PROCEDURE — 92134 CPTRZ OPH DX IMG PST SGM RTA: CPT

## 2024-12-11 PROCEDURE — 67028 INJECTION EYE DRUG: CPT | Mod: 50

## 2024-12-11 PROCEDURE — 92014 COMPRE OPH EXAM EST PT 1/>: CPT | Mod: 25

## 2024-12-11 ASSESSMENT — TONOMETRY
OD_IOP_MMHG: 13
OS_IOP_MMHG: 12

## 2024-12-11 ASSESSMENT — VISUAL ACUITY
OS_CC: CF 6FT
OD_CC: CF 2FT

## 2025-02-05 ENCOUNTER — FOLLOW UP (OUTPATIENT)
Dept: URBAN - METROPOLITAN AREA CLINIC 24 | Facility: CLINIC | Age: OVER 89
End: 2025-02-05

## 2025-02-05 DIAGNOSIS — H35.3123: ICD-10-CM

## 2025-02-05 DIAGNOSIS — H35.3114: ICD-10-CM

## 2025-02-05 DIAGNOSIS — H43.813: ICD-10-CM

## 2025-02-05 DIAGNOSIS — H35.3231: ICD-10-CM

## 2025-02-05 PROCEDURE — 92202 OPSCPY EXTND ON/MAC DRAW: CPT | Mod: NC

## 2025-02-05 PROCEDURE — 67028 INJECTION EYE DRUG: CPT | Mod: 50

## 2025-02-05 PROCEDURE — 92134 CPTRZ OPH DX IMG PST SGM RTA: CPT

## 2025-02-05 PROCEDURE — 92014 COMPRE OPH EXAM EST PT 1/>: CPT | Mod: 25

## 2025-02-05 ASSESSMENT — TONOMETRY
OD_IOP_MMHG: 16
OS_IOP_MMHG: 15

## 2025-02-05 ASSESSMENT — VISUAL ACUITY
OD_CC: CF 1FT
OS_CC: CF 3FT

## 2025-04-02 ENCOUNTER — FOLLOW UP (OUTPATIENT)
Age: OVER 89
End: 2025-04-02

## 2025-04-02 DIAGNOSIS — H43.813: ICD-10-CM

## 2025-04-02 DIAGNOSIS — H35.3231: ICD-10-CM

## 2025-04-02 DIAGNOSIS — H35.3123: ICD-10-CM

## 2025-04-02 DIAGNOSIS — H35.3114: ICD-10-CM

## 2025-04-02 PROCEDURE — 67028 INJECTION EYE DRUG: CPT | Mod: 50

## 2025-04-02 PROCEDURE — 92014 COMPRE OPH EXAM EST PT 1/>: CPT | Mod: 25

## 2025-04-02 PROCEDURE — 92134 CPTRZ OPH DX IMG PST SGM RTA: CPT | Mod: NC

## 2025-04-02 PROCEDURE — 92250 FUNDUS PHOTOGRAPHY W/I&R: CPT

## 2025-04-02 PROCEDURE — 92202 OPSCPY EXTND ON/MAC DRAW: CPT | Mod: NC

## 2025-04-02 ASSESSMENT — VISUAL ACUITY
OS_SC: 20/80
OD_SC: 20/200

## 2025-04-02 ASSESSMENT — TONOMETRY
OS_IOP_MMHG: 11
OD_IOP_MMHG: 10

## (undated) DEVICE — 3.2MM GUIDE WIRE 400MM

## (undated) DEVICE — TELFA ADHESIVE ISLAND DRESSING: Brand: TELFA

## (undated) DEVICE — STERILE POLYISOPRENE POWDER-FREE SURGICAL GLOVES: Brand: PROTEXIS

## (undated) DEVICE — BASIC GENERAL-SFMC: Brand: MEDLINE INDUSTRIES, INC.

## (undated) DEVICE — GOWN,SIRUS,NONRNF,SETINSLV,2XL,18/CS: Brand: MEDLINE

## (undated) DEVICE — STERILE POLYISOPRENE POWDER-FREE SURGICAL GLOVES WITH EMOLLIENT COATING: Brand: PROTEXIS

## (undated) DEVICE — CANISTER, RIGID, 3000CC: Brand: MEDLINE INDUSTRIES, INC.

## (undated) DEVICE — SUTURE MCRYL SZ 3-0 L27IN ABSRB UD L24MM PS-1 3/8 CIR PRIM Y936H

## (undated) DEVICE — C-ARM: Brand: UNBRANDED

## (undated) DEVICE — 6619 2 PTNT ISO SYS INCISE AREA&LT;(&GT;&&LT;)&GT;P: Brand: STERI-DRAPE™ IOBAN™ 2

## (undated) DEVICE — SUTURE VCRL SZ 2-0 L36IN ABSRB UD L36MM CT-1 1/2 CIR J945H

## (undated) DEVICE — SOLUTION IRRIG 1000ML 0.9% SOD CHL USP POUR PLAS BTL

## (undated) DEVICE — DERMABOND SKIN ADH 0.7ML -- DERMABOND ADVANCED 12/BX

## (undated) DEVICE — BIT DRL L330MM DIA4.2MM CALIB 100MM 3 FLUT QUIK CPL